# Patient Record
Sex: FEMALE | URBAN - METROPOLITAN AREA
[De-identification: names, ages, dates, MRNs, and addresses within clinical notes are randomized per-mention and may not be internally consistent; named-entity substitution may affect disease eponyms.]

---

## 2019-04-11 ENCOUNTER — NEW PATIENT (OUTPATIENT)
Dept: URBAN - METROPOLITAN AREA CLINIC 60 | Facility: CLINIC | Age: 54
End: 2019-04-11
Payer: COMMERCIAL

## 2019-04-11 DIAGNOSIS — H52.4 PRESBYOPIA: ICD-10-CM

## 2019-04-11 DIAGNOSIS — M06.9 RHEUMATOID ARTHRITIS, UNSPECIFIED: Primary | ICD-10-CM

## 2019-04-11 DIAGNOSIS — Z79.899 OTHER LONG TERM (CURRENT) DRUG THERAPY: ICD-10-CM

## 2019-04-11 DIAGNOSIS — H25.813 COMBINED FORMS OF AGE-RELATED CATARACT, BILATERAL: ICD-10-CM

## 2019-04-11 PROCEDURE — 92004 COMPRE OPH EXAM NEW PT 1/>: CPT | Performed by: OPTOMETRIST

## 2019-04-11 PROCEDURE — 92134 CPTRZ OPH DX IMG PST SGM RTA: CPT | Performed by: OPTOMETRIST

## 2019-04-11 ASSESSMENT — INTRAOCULAR PRESSURE
OD: 17
OS: 17

## 2019-05-01 ENCOUNTER — Encounter (OUTPATIENT)
Dept: URBAN - METROPOLITAN AREA CLINIC 60 | Facility: CLINIC | Age: 54
End: 2019-05-01
Payer: COMMERCIAL

## 2019-05-01 PROCEDURE — 92081 LIMITED VISUAL FIELD XM: CPT | Performed by: OPTOMETRIST

## 2020-10-19 ENCOUNTER — FOLLOW UP ESTABLISHED (OUTPATIENT)
Dept: URBAN - METROPOLITAN AREA CLINIC 60 | Facility: CLINIC | Age: 55
End: 2020-10-19
Payer: COMMERCIAL

## 2020-10-19 DIAGNOSIS — H16.223 KERATOCONJUNCTIVITIS SICCA, BILATERAL: ICD-10-CM

## 2020-10-19 PROCEDURE — 92083 EXTENDED VISUAL FIELD XM: CPT | Performed by: OPTOMETRIST

## 2020-10-19 PROCEDURE — 92134 CPTRZ OPH DX IMG PST SGM RTA: CPT | Performed by: OPTOMETRIST

## 2020-10-19 PROCEDURE — 92014 COMPRE OPH EXAM EST PT 1/>: CPT | Performed by: OPTOMETRIST

## 2020-10-19 ASSESSMENT — VISUAL ACUITY
OD: 20/25
OS: 20/20

## 2020-10-19 ASSESSMENT — INTRAOCULAR PRESSURE
OD: 16
OS: 16

## 2021-09-03 ENCOUNTER — OFFICE VISIT (OUTPATIENT)
Dept: URBAN - METROPOLITAN AREA CLINIC 60 | Facility: CLINIC | Age: 56
End: 2021-09-03
Payer: COMMERCIAL

## 2021-09-03 DIAGNOSIS — H25.13 AGE-RELATED NUCLEAR CATARACT, BILATERAL: ICD-10-CM

## 2021-09-03 PROCEDURE — 92014 COMPRE OPH EXAM EST PT 1/>: CPT | Performed by: OPTOMETRIST

## 2021-09-03 PROCEDURE — 92134 CPTRZ OPH DX IMG PST SGM RTA: CPT | Performed by: OPTOMETRIST

## 2021-09-03 ASSESSMENT — VISUAL ACUITY
OS: 20/20
OD: 20/25

## 2021-09-03 ASSESSMENT — INTRAOCULAR PRESSURE
OD: 16
OS: 18

## 2021-09-03 NOTE — IMPRESSION/PLAN
Impression: Other long term (current) drug therapy: Z79.899. Plan: No evidence of Plaquenil toxicity. Reviewed today's OCT(MAC) results with patient. Results of OCT normal OU. Patient was educated on today's findings. Recommend patient return yearly for a complete eye exam and OCT(MAC).

## 2023-08-24 ENCOUNTER — HOSPITAL ENCOUNTER (EMERGENCY)
Facility: HOSPITAL | Age: 58
Discharge: HOME | End: 2023-08-24
Attending: EMERGENCY MEDICINE
Payer: MEDICAID

## 2023-08-24 VITALS
SYSTOLIC BLOOD PRESSURE: 133 MMHG | BODY MASS INDEX: 37.56 KG/M2 | HEART RATE: 94 BPM | HEIGHT: 64 IN | TEMPERATURE: 98 F | RESPIRATION RATE: 16 BRPM | WEIGHT: 220 LBS | DIASTOLIC BLOOD PRESSURE: 85 MMHG | OXYGEN SATURATION: 96 %

## 2023-08-24 DIAGNOSIS — Z76.0 ENCOUNTER FOR MEDICATION REFILL: Primary | ICD-10-CM

## 2023-08-24 PROCEDURE — 99283 EMERGENCY DEPT VISIT LOW MDM: CPT

## 2023-08-24 RX ORDER — BENZTROPINE MESYLATE 1 MG/1
1 TABLET ORAL 2 TIMES DAILY
COMMUNITY

## 2023-08-24 RX ORDER — DULOXETIN HYDROCHLORIDE 30 MG/1
CAPSULE, DELAYED RELEASE ORAL DAILY
COMMUNITY

## 2023-08-24 RX ORDER — AMITRIPTYLINE HYDROCHLORIDE 10 MG/1
10 TABLET, FILM COATED ORAL NIGHTLY
COMMUNITY
End: 2023-09-08

## 2023-08-24 RX ORDER — DULOXETIN HYDROCHLORIDE 30 MG/1
30 CAPSULE, DELAYED RELEASE ORAL DAILY
Qty: 30 CAPSULE | Refills: 0 | Status: SHIPPED | OUTPATIENT
Start: 2023-08-24 | End: 2023-09-05

## 2023-08-24 RX ORDER — CETIRIZINE HYDROCHLORIDE 5 MG/1
5 TABLET ORAL DAILY
COMMUNITY

## 2023-08-24 RX ORDER — LEVOTHYROXINE SODIUM 0.03 MG/1
50 TABLET ORAL
COMMUNITY
End: 2023-09-05 | Stop reason: DRUGHIGH

## 2023-08-24 RX ORDER — HALOPERIDOL 10 MG/1
10 TABLET ORAL NIGHTLY
COMMUNITY

## 2023-08-24 RX ORDER — LITHIUM CARBONATE 300 MG/1
300 CAPSULE ORAL 2 TIMES DAILY WITH MEALS
COMMUNITY
End: 2023-09-10

## 2023-08-24 RX ORDER — TRIAMCINOLONE ACETONIDE 1 MG/G
1 CREAM TOPICAL 2 TIMES DAILY
Qty: 1 EACH | Refills: 0 | Status: SHIPPED | OUTPATIENT
Start: 2023-08-24 | End: 2023-08-24

## 2023-08-24 RX ORDER — CHOLECALCIFEROL (VITAMIN D3) 125 MCG
2000 CAPSULE ORAL DAILY
COMMUNITY

## 2023-08-24 RX ORDER — OMEPRAZOLE 20 MG/1
20 CAPSULE, DELAYED RELEASE ORAL
COMMUNITY
End: 2023-09-18

## 2023-08-24 RX ORDER — ARIPIPRAZOLE 20 MG/1
20 TABLET ORAL DAILY
COMMUNITY
End: 2023-09-10

## 2023-08-24 RX ORDER — DULOXETIN HYDROCHLORIDE 30 MG/1
30 CAPSULE, DELAYED RELEASE ORAL DAILY
Qty: 30 CAPSULE | Refills: 0 | Status: SHIPPED | OUTPATIENT
Start: 2023-08-24 | End: 2023-08-24

## 2023-08-24 RX ORDER — LEVOTHYROXINE SODIUM 0.05 MG/1
50 TABLET ORAL
Qty: 30 TABLET | Refills: 0 | Status: SHIPPED | OUTPATIENT
Start: 2023-08-24 | End: 2023-08-24

## 2023-08-24 RX ORDER — DOCUSATE SODIUM 250 MG
250 CAPSULE ORAL DAILY
COMMUNITY

## 2023-08-24 RX ORDER — LEVOTHYROXINE SODIUM 0.05 MG/1
50 TABLET ORAL
Qty: 30 TABLET | Refills: 0 | Status: SHIPPED | OUTPATIENT
Start: 2023-08-24 | End: 2023-09-07

## 2023-08-24 RX ORDER — HALOPERIDOL 10 MG/1
10 TABLET ORAL EVERY EVENING
Qty: 30 TABLET | Refills: 0 | Status: SHIPPED | OUTPATIENT
Start: 2023-08-24 | End: 2023-08-24

## 2023-08-24 RX ORDER — TRIAMCINOLONE ACETONIDE 1 MG/G
1 CREAM TOPICAL 2 TIMES DAILY
Qty: 1 EACH | Refills: 0 | Status: SHIPPED | OUTPATIENT
Start: 2023-08-24 | End: 2023-09-08

## 2023-08-24 RX ORDER — HYDROXYZINE 50 MG/1
50 TABLET, FILM COATED ORAL 3 TIMES DAILY PRN
COMMUNITY

## 2023-08-24 RX ORDER — METHOCARBAMOL 500 MG/1
500 TABLET, FILM COATED ORAL 3 TIMES DAILY
COMMUNITY

## 2023-08-24 RX ORDER — HALOPERIDOL 10 MG/1
10 TABLET ORAL EVERY EVENING
Qty: 30 TABLET | Refills: 0 | Status: SHIPPED | OUTPATIENT
Start: 2023-08-24 | End: 2023-09-05

## 2023-08-24 NOTE — ED INITIAL ASSESSMENT (HPI)
Pt needs her home meds refilled. She just moved to [place] from [facility] and does not yet have a physician.

## 2023-08-25 NOTE — DISCHARGE INSTRUCTIONS
Follow-up with Aye Ports return if feeling suicidal, homicidal.  Return if any other complaints. Follow-up with your primary care physician and Aye Ports for further evaluation.

## 2023-09-05 ENCOUNTER — OFFICE VISIT (OUTPATIENT)
Dept: FAMILY MEDICINE CLINIC | Facility: CLINIC | Age: 58
End: 2023-09-05
Payer: MEDICAID

## 2023-09-05 VITALS
SYSTOLIC BLOOD PRESSURE: 118 MMHG | BODY MASS INDEX: 38.07 KG/M2 | RESPIRATION RATE: 18 BRPM | DIASTOLIC BLOOD PRESSURE: 68 MMHG | TEMPERATURE: 98 F | WEIGHT: 223 LBS | OXYGEN SATURATION: 99 % | HEIGHT: 64 IN | HEART RATE: 81 BPM

## 2023-09-05 DIAGNOSIS — R31.29 MICROSCOPIC HEMATURIA: ICD-10-CM

## 2023-09-05 DIAGNOSIS — Z13.1 SCREENING FOR DIABETES MELLITUS: ICD-10-CM

## 2023-09-05 DIAGNOSIS — Z12.11 SCREENING FOR COLON CANCER: ICD-10-CM

## 2023-09-05 DIAGNOSIS — J30.9 ALLERGIC RHINITIS, UNSPECIFIED SEASONALITY, UNSPECIFIED TRIGGER: ICD-10-CM

## 2023-09-05 DIAGNOSIS — F43.10 PTSD (POST-TRAUMATIC STRESS DISORDER): ICD-10-CM

## 2023-09-05 DIAGNOSIS — M54.50 CHRONIC LOW BACK PAIN WITHOUT SCIATICA, UNSPECIFIED BACK PAIN LATERALITY: ICD-10-CM

## 2023-09-05 DIAGNOSIS — Z12.4 SCREENING FOR CERVICAL CANCER: ICD-10-CM

## 2023-09-05 DIAGNOSIS — F31.9 BIPOLAR 1 DISORDER (HCC): ICD-10-CM

## 2023-09-05 DIAGNOSIS — R25.2 LEG CRAMPS: ICD-10-CM

## 2023-09-05 DIAGNOSIS — Z12.31 ENCOUNTER FOR SCREENING MAMMOGRAM FOR MALIGNANT NEOPLASM OF BREAST: ICD-10-CM

## 2023-09-05 DIAGNOSIS — B37.2 YEAST DERMATITIS: ICD-10-CM

## 2023-09-05 DIAGNOSIS — M06.9 RHEUMATOID ARTHRITIS, INVOLVING UNSPECIFIED SITE, UNSPECIFIED WHETHER RHEUMATOID FACTOR PRESENT (HCC): ICD-10-CM

## 2023-09-05 DIAGNOSIS — Z00.00 LABORATORY EXAM ORDERED AS PART OF ROUTINE GENERAL MEDICAL EXAMINATION: ICD-10-CM

## 2023-09-05 DIAGNOSIS — K21.9 GASTROESOPHAGEAL REFLUX DISEASE, UNSPECIFIED WHETHER ESOPHAGITIS PRESENT: ICD-10-CM

## 2023-09-05 DIAGNOSIS — J45.20 MILD INTERMITTENT ASTHMA WITHOUT COMPLICATION: ICD-10-CM

## 2023-09-05 DIAGNOSIS — E03.9 ACQUIRED HYPOTHYROIDISM: ICD-10-CM

## 2023-09-05 DIAGNOSIS — R13.10 DYSPHAGIA, UNSPECIFIED TYPE: ICD-10-CM

## 2023-09-05 DIAGNOSIS — Z00.00 WELLNESS EXAMINATION: Primary | ICD-10-CM

## 2023-09-05 DIAGNOSIS — G89.29 CHRONIC LOW BACK PAIN WITHOUT SCIATICA, UNSPECIFIED BACK PAIN LATERALITY: ICD-10-CM

## 2023-09-05 DIAGNOSIS — Z87.440 HISTORY OF UTI: ICD-10-CM

## 2023-09-05 LAB
APPEARANCE: CLEAR
BILIRUBIN: NEGATIVE
GLUCOSE (URINE DIPSTICK): NEGATIVE MG/DL
KETONES (URINE DIPSTICK): NEGATIVE MG/DL
LEUKOCYTES: NEGATIVE
MULTISTIX LOT#: ABNORMAL NUMERIC
NITRITE, URINE: NEGATIVE
PH, URINE: 7 (ref 4.5–8)
PROTEIN (URINE DIPSTICK): NEGATIVE MG/DL
SPECIFIC GRAVITY: 1.01 (ref 1–1.03)
URINE-COLOR: YELLOW
UROBILINOGEN,SEMI-QN: 0.2 MG/DL (ref 0–1.9)

## 2023-09-05 PROCEDURE — 87624 HPV HI-RISK TYP POOLED RSLT: CPT | Performed by: FAMILY MEDICINE

## 2023-09-05 PROCEDURE — 87086 URINE CULTURE/COLONY COUNT: CPT | Performed by: FAMILY MEDICINE

## 2023-09-05 PROCEDURE — 3008F BODY MASS INDEX DOCD: CPT | Performed by: FAMILY MEDICINE

## 2023-09-05 PROCEDURE — 99386 PREV VISIT NEW AGE 40-64: CPT | Performed by: FAMILY MEDICINE

## 2023-09-05 PROCEDURE — 88175 CYTOPATH C/V AUTO FLUID REDO: CPT | Performed by: FAMILY MEDICINE

## 2023-09-05 PROCEDURE — 81003 URINALYSIS AUTO W/O SCOPE: CPT | Performed by: FAMILY MEDICINE

## 2023-09-05 PROCEDURE — 3078F DIAST BP <80 MM HG: CPT | Performed by: FAMILY MEDICINE

## 2023-09-05 PROCEDURE — 3074F SYST BP LT 130 MM HG: CPT | Performed by: FAMILY MEDICINE

## 2023-09-05 RX ORDER — HALOPERIDOL 5 MG/1
5 TABLET ORAL EVERY MORNING
COMMUNITY
End: 2023-09-08

## 2023-09-05 RX ORDER — KETOCONAZOLE 20 MG/G
CREAM TOPICAL
Qty: 30 G | Refills: 1 | Status: SHIPPED | OUTPATIENT
Start: 2023-09-05

## 2023-09-05 RX ORDER — ALBUTEROL SULFATE 90 UG/1
2 AEROSOL, METERED RESPIRATORY (INHALATION) EVERY 4 HOURS PRN
Qty: 1 EACH | Refills: 0 | Status: SHIPPED | OUTPATIENT
Start: 2023-09-05

## 2023-09-05 NOTE — PATIENT INSTRUCTIONS
Go [facility] for your fasting blood tests. Do not eat or drink except for water for at least 8 hours prior to the blood tests. Do not take any vitamins or Biotin before your blood tests. Schedule your mammogram as ordered. Schedule your colonoscopy as ordered. Make an appointment with the gastroenterologist, Dr. Mook Kovacs or one of his partners, for further evaluation of your heartburn and difficulty swallowing. Make an appointment with the rheumatologist, Dr. Kenny Fernandez or one of her partners, for further evaluation of your rheumatoid arthritis. Keep your appointment with the behavorial health provider as scheduled on 9/8/2023. Continue your medications as prescribed. Make an appointment with the [facility] department vaccine clinic. Recommendations for exercise are 3-5 times weekly for 30-60 minutes for a minimum of 150-300 minutes. For premenopausal women and men, 1000 mg of calcium daily is recommended. For postmenopausal women, 1200 mg of calcium daily is recommended. To help the body absorb and use calcium, vitamin D 2000 international units daily is recommended. I will contact you with your test results once available.

## 2023-09-06 LAB — HPV I/H RISK 1 DNA SPEC QL NAA+PROBE: NEGATIVE

## 2023-09-07 ENCOUNTER — LAB ENCOUNTER (OUTPATIENT)
Dept: LAB | Age: 58
End: 2023-09-07
Attending: FAMILY MEDICINE
Payer: MEDICAID

## 2023-09-07 DIAGNOSIS — Z00.00 LABORATORY EXAM ORDERED AS PART OF ROUTINE GENERAL MEDICAL EXAMINATION: ICD-10-CM

## 2023-09-07 DIAGNOSIS — M06.9: Primary | ICD-10-CM

## 2023-09-07 DIAGNOSIS — R25.2 LEG CRAMPS: ICD-10-CM

## 2023-09-07 DIAGNOSIS — E03.9 ACQUIRED HYPOTHYROIDISM: Primary | ICD-10-CM

## 2023-09-07 DIAGNOSIS — F31.9 BIPOLAR 1 DISORDER (HCC): ICD-10-CM

## 2023-09-07 DIAGNOSIS — Z13.1 SCREENING FOR DIABETES MELLITUS: ICD-10-CM

## 2023-09-07 DIAGNOSIS — M06.9 RHEUMATOID ARTHRITIS, INVOLVING UNSPECIFIED SITE, UNSPECIFIED WHETHER RHEUMATOID FACTOR PRESENT (HCC): ICD-10-CM

## 2023-09-07 LAB
ALBUMIN SERPL-MCNC: 3.6 G/DL (ref 3.4–5)
ALBUMIN/GLOB SERPL: 0.8 {RATIO} (ref 1–2)
ALP LIVER SERPL-CCNC: 78 U/L
ALT SERPL-CCNC: 31 U/L
ANION GAP SERPL CALC-SCNC: 5 MMOL/L (ref 0–18)
AST SERPL-CCNC: 18 U/L (ref 15–37)
BASOPHILS # BLD AUTO: 0.06 X10(3) UL (ref 0–0.2)
BASOPHILS NFR BLD AUTO: 0.5 %
BILIRUB SERPL-MCNC: 0.4 MG/DL (ref 0.1–2)
BUN BLD-MCNC: 11 MG/DL (ref 7–18)
CALCIUM BLD-MCNC: 9.4 MG/DL (ref 8.5–10.1)
CHLORIDE SERPL-SCNC: 105 MMOL/L (ref 98–112)
CHOLEST SERPL-MCNC: 278 MG/DL (ref ?–200)
CO2 SERPL-SCNC: 29 MMOL/L (ref 21–32)
CREAT BLD-MCNC: 1.06 MG/DL
CRP SERPL-MCNC: 0.88 MG/DL (ref ?–0.3)
EGFRCR SERPLBLD CKD-EPI 2021: 62 ML/MIN/1.73M2 (ref 60–?)
EOSINOPHIL # BLD AUTO: 0.27 X10(3) UL (ref 0–0.7)
EOSINOPHIL NFR BLD AUTO: 2.3 %
ERYTHROCYTE [DISTWIDTH] IN BLOOD BY AUTOMATED COUNT: 12.5 %
ERYTHROCYTE [SEDIMENTATION RATE] IN BLOOD: 67 MM/HR
EST. AVERAGE GLUCOSE BLD GHB EST-MCNC: 131 MG/DL (ref 68–126)
FASTING PATIENT LIPID ANSWER: YES
FASTING STATUS PATIENT QL REPORTED: YES
GLOBULIN PLAS-MCNC: 4.5 G/DL (ref 2.8–4.4)
GLUCOSE BLD-MCNC: 128 MG/DL (ref 70–99)
HBA1C MFR BLD: 6.2 % (ref ?–5.7)
HCT VFR BLD AUTO: 38.6 %
HCV AB SERPL QL IA: NONREACTIVE
HDLC SERPL-MCNC: 49 MG/DL (ref 40–59)
HGB BLD-MCNC: 12.1 G/DL
IMM GRANULOCYTES # BLD AUTO: 0.06 X10(3) UL (ref 0–1)
IMM GRANULOCYTES NFR BLD: 0.5 %
LDLC SERPL CALC-MCNC: 180 MG/DL (ref ?–100)
LITHIUM SERPL-SCNC: 0.7 MMOL/L (ref 0.6–1.2)
LYMPHOCYTES # BLD AUTO: 2.95 X10(3) UL (ref 1–4)
LYMPHOCYTES NFR BLD AUTO: 25.4 %
MAGNESIUM SERPL-MCNC: 1.9 MG/DL (ref 1.6–2.6)
MCH RBC QN AUTO: 29.3 PG (ref 26–34)
MCHC RBC AUTO-ENTMCNC: 31.3 G/DL (ref 31–37)
MCV RBC AUTO: 93.5 FL
MONOCYTES # BLD AUTO: 0.8 X10(3) UL (ref 0.1–1)
MONOCYTES NFR BLD AUTO: 6.9 %
NEUTROPHILS # BLD AUTO: 7.46 X10 (3) UL (ref 1.5–7.7)
NEUTROPHILS # BLD AUTO: 7.46 X10(3) UL (ref 1.5–7.7)
NEUTROPHILS NFR BLD AUTO: 64.4 %
NONHDLC SERPL-MCNC: 229 MG/DL (ref ?–130)
OSMOLALITY SERPL CALC.SUM OF ELEC: 289 MOSM/KG (ref 275–295)
PLATELET # BLD AUTO: 332 10(3)UL (ref 150–450)
POTASSIUM SERPL-SCNC: 3.9 MMOL/L (ref 3.5–5.1)
PROT SERPL-MCNC: 8.1 G/DL (ref 6.4–8.2)
RBC # BLD AUTO: 4.13 X10(6)UL
RHEUMATOID FACT SERPL-ACNC: <10 IU/ML (ref ?–15)
SODIUM SERPL-SCNC: 139 MMOL/L (ref 136–145)
T4 FREE SERPL-MCNC: 0.9 NG/DL (ref 0.8–1.7)
TRIGL SERPL-MCNC: 256 MG/DL (ref 30–149)
TSI SER-ACNC: 9.72 MIU/ML (ref 0.36–3.74)
URATE SERPL-MCNC: 6.3 MG/DL
VLDLC SERPL CALC-MCNC: 53 MG/DL (ref 0–30)
WBC # BLD AUTO: 11.6 X10(3) UL (ref 4–11)

## 2023-09-07 PROCEDURE — 86200 CCP ANTIBODY: CPT

## 2023-09-07 PROCEDURE — 86225 DNA ANTIBODY NATIVE: CPT

## 2023-09-07 PROCEDURE — 80178 ASSAY OF LITHIUM: CPT

## 2023-09-07 PROCEDURE — 84550 ASSAY OF BLOOD/URIC ACID: CPT

## 2023-09-07 PROCEDURE — 83735 ASSAY OF MAGNESIUM: CPT

## 2023-09-07 PROCEDURE — 86038 ANTINUCLEAR ANTIBODIES: CPT

## 2023-09-07 PROCEDURE — 36415 COLL VENOUS BLD VENIPUNCTURE: CPT

## 2023-09-07 PROCEDURE — 86140 C-REACTIVE PROTEIN: CPT

## 2023-09-07 PROCEDURE — 86803 HEPATITIS C AB TEST: CPT

## 2023-09-07 PROCEDURE — 80053 COMPREHEN METABOLIC PANEL: CPT

## 2023-09-07 PROCEDURE — 84443 ASSAY THYROID STIM HORMONE: CPT

## 2023-09-07 PROCEDURE — 85025 COMPLETE CBC W/AUTO DIFF WBC: CPT

## 2023-09-07 PROCEDURE — 84439 ASSAY OF FREE THYROXINE: CPT

## 2023-09-07 PROCEDURE — 83036 HEMOGLOBIN GLYCOSYLATED A1C: CPT

## 2023-09-07 PROCEDURE — 80061 LIPID PANEL: CPT

## 2023-09-07 PROCEDURE — 86431 RHEUMATOID FACTOR QUANT: CPT

## 2023-09-07 PROCEDURE — 85652 RBC SED RATE AUTOMATED: CPT

## 2023-09-07 RX ORDER — LEVOTHYROXINE SODIUM 0.07 MG/1
75 TABLET ORAL
Qty: 90 TABLET | Refills: 0 | Status: SHIPPED | OUTPATIENT
Start: 2023-09-07 | End: 2023-12-06

## 2023-09-08 ENCOUNTER — HOSPITAL ENCOUNTER (OUTPATIENT)
Age: 58
Discharge: HOME | End: 2023-09-08
Payer: MEDICAID

## 2023-09-08 VITALS
SYSTOLIC BLOOD PRESSURE: 109 MMHG | HEIGHT: 64 IN | BODY MASS INDEX: 38.07 KG/M2 | TEMPERATURE: 98 F | WEIGHT: 223 LBS | DIASTOLIC BLOOD PRESSURE: 73 MMHG | OXYGEN SATURATION: 97 % | HEART RATE: 81 BPM | RESPIRATION RATE: 18 BRPM

## 2023-09-08 DIAGNOSIS — Z20.822 ENCOUNTER FOR LABORATORY TESTING FOR COVID-19 VIRUS: ICD-10-CM

## 2023-09-08 DIAGNOSIS — Z20.822 CLOSE EXPOSURE TO COVID-19 VIRUS: Primary | ICD-10-CM

## 2023-09-08 DIAGNOSIS — Z76.0 ENCOUNTER FOR MEDICATION REFILL: ICD-10-CM

## 2023-09-08 LAB
CCP IGG SERPL-ACNC: <0.4 U/ML (ref 0–6.9)
DSDNA IGG SERPL IA-ACNC: <0.6 IU/ML
ENA AB SER QL IA: 0.1 UG/L
ENA AB SER QL IA: NEGATIVE

## 2023-09-08 PROCEDURE — 99213 OFFICE O/P EST LOW 20 MIN: CPT | Performed by: NURSE PRACTITIONER

## 2023-09-08 RX ORDER — AMITRIPTYLINE HYDROCHLORIDE 10 MG/1
10 TABLET, FILM COATED ORAL NIGHTLY
Qty: 14 TABLET | Refills: 0 | Status: SHIPPED | OUTPATIENT
Start: 2023-09-08 | End: 2023-09-10

## 2023-09-08 RX ORDER — HALOPERIDOL 5 MG/1
5 TABLET ORAL EVERY MORNING
Qty: 14 TABLET | Refills: 0 | Status: SHIPPED | OUTPATIENT
Start: 2023-09-08

## 2023-09-08 RX ORDER — CLONAZEPAM 1 MG/1
1 TABLET ORAL 2 TIMES DAILY
Qty: 28 TABLET | Refills: 0 | Status: SHIPPED | OUTPATIENT
Start: 2023-09-08

## 2023-09-08 RX ORDER — DEXTROAMPHETAMINE SACCHARATE, AMPHETAMINE ASPARTATE, DEXTROAMPHETAMINE SULFATE AND AMPHETAMINE SULFATE 7.5; 7.5; 7.5; 7.5 MG/1; MG/1; MG/1; MG/1
30 TABLET ORAL 2 TIMES DAILY
Qty: 28 TABLET | Refills: 0 | Status: SHIPPED | OUTPATIENT
Start: 2023-09-08 | End: 2023-09-22

## 2023-09-10 ENCOUNTER — TELEPHONE (OUTPATIENT)
Dept: FAMILY MEDICINE CLINIC | Facility: CLINIC | Age: 58
End: 2023-09-10

## 2023-09-10 LAB — SARS-COV-2 RNA RESP QL NAA+PROBE: NOT DETECTED

## 2023-09-10 RX ORDER — ARIPIPRAZOLE 20 MG/1
20 TABLET ORAL DAILY
Qty: 90 TABLET | Refills: 0 | Status: SHIPPED | OUTPATIENT
Start: 2023-09-10 | End: 2023-12-09

## 2023-09-10 RX ORDER — AMITRIPTYLINE HYDROCHLORIDE 150 MG/1
150 TABLET ORAL NIGHTLY
Qty: 90 TABLET | Refills: 0 | Status: SHIPPED | OUTPATIENT
Start: 2023-09-10 | End: 2023-12-09

## 2023-09-10 RX ORDER — LITHIUM CARBONATE 300 MG/1
300 CAPSULE ORAL 2 TIMES DAILY WITH MEALS
Qty: 180 CAPSULE | Refills: 0 | Status: SHIPPED | OUTPATIENT
Start: 2023-09-10 | End: 2023-12-09

## 2023-09-10 NOTE — TELEPHONE ENCOUNTER
Patient's sister and guardian Estela Pitt called and patient is present to discuss medication refills. Estela Pitt stated that she is getting her sisters medication together and noticed that she did not have enough medications starting tomorrow. Patient needed refills on the amitriptyline  HCL 150mg nightly , lithium,300mg bid and aripiprazole 20mg daily. Patients sister had read the previous bottles of medication to reconcile the medication to confirm dosages. Discussed that patient will need to schedule follow up with Dr. Bobbi Pham office. Patient and sister are agreeable to this plan of care. Refills have been sent to preferred pharmacy.

## 2023-09-15 ENCOUNTER — TELEPHONE (OUTPATIENT)
Dept: FAMILY MEDICINE CLINIC | Facility: CLINIC | Age: 58
End: 2023-09-15

## 2023-09-18 ENCOUNTER — TELEMEDICINE (OUTPATIENT)
Dept: FAMILY MEDICINE CLINIC | Facility: CLINIC | Age: 58
End: 2023-09-18
Payer: MEDICAID

## 2023-09-18 VITALS — BODY MASS INDEX: 39 KG/M2 | WEIGHT: 230 LBS

## 2023-09-18 DIAGNOSIS — F31.9 BIPOLAR 1 DISORDER (HCC): ICD-10-CM

## 2023-09-18 DIAGNOSIS — K21.9 GASTROESOPHAGEAL REFLUX DISEASE, UNSPECIFIED WHETHER ESOPHAGITIS PRESENT: ICD-10-CM

## 2023-09-18 DIAGNOSIS — J45.20 MILD INTERMITTENT ASTHMA WITHOUT COMPLICATION: ICD-10-CM

## 2023-09-18 DIAGNOSIS — E78.2 MIXED HYPERLIPIDEMIA: Primary | ICD-10-CM

## 2023-09-18 DIAGNOSIS — M06.00 RHEUMATOID ARTHRITIS WITH NEGATIVE RHEUMATOID FACTOR, INVOLVING UNSPECIFIED SITE (HCC): ICD-10-CM

## 2023-09-18 DIAGNOSIS — R73.03 PREDIABETES: ICD-10-CM

## 2023-09-18 DIAGNOSIS — E03.9 ACQUIRED HYPOTHYROIDISM: ICD-10-CM

## 2023-09-18 RX ORDER — ARIPIPRAZOLE 20 MG/1
20 TABLET ORAL DAILY
Qty: 90 TABLET | Refills: 0 | OUTPATIENT
Start: 2023-09-18

## 2023-09-18 RX ORDER — OMEPRAZOLE 20 MG/1
20 CAPSULE, DELAYED RELEASE ORAL
Qty: 90 CAPSULE | Refills: 1 | Status: SHIPPED | OUTPATIENT
Start: 2023-09-18

## 2023-09-18 RX ORDER — ATORVASTATIN CALCIUM 20 MG/1
20 TABLET, FILM COATED ORAL NIGHTLY
Qty: 90 TABLET | Refills: 1 | Status: SHIPPED | OUTPATIENT
Start: 2023-09-18

## 2023-09-18 NOTE — TELEPHONE ENCOUNTER
Requested Renewals     Name from pharmacy: ARIPIPRAZOLE 20MG (TWENTY MG) TABS         Will file in chart as: ARIPIPRAZOLE 20 MG Oral Tab     Possible duplicate: Hover to review recent actions on this medication    Sig: TAKE 1 TABLET(20 MG) BY MOUTH DAILY    Disp: 90 tablet    Refills: 0 (Pharmacy requested: Not specified)    Start: 9/15/2023    Class: Normal    Non-formulary    To pharmacy: ZERO refills remain on this prescription.  Your patient is requesting advance approval of refills for this medication to [address] [facility]          Future Appointments   Date Time Provider Roxana Malave   9/18/2023  2:00 PM Ladi Vargas DO [facility] [place]

## 2023-09-20 ENCOUNTER — MED REC SCAN ONLY (OUTPATIENT)
Dept: FAMILY MEDICINE CLINIC | Facility: CLINIC | Age: 58
End: 2023-09-20

## 2023-09-25 ENCOUNTER — TELEPHONE (OUTPATIENT)
Dept: FAMILY MEDICINE CLINIC | Facility: CLINIC | Age: 58
End: 2023-09-25

## 2023-09-25 RX ORDER — DULOXETIN HYDROCHLORIDE 30 MG/1
30 CAPSULE, DELAYED RELEASE ORAL DAILY
Qty: 90 CAPSULE | Refills: 0 | Status: SHIPPED | OUTPATIENT
Start: 2023-09-25

## 2023-09-25 RX ORDER — CLONAZEPAM 1 MG/1
1 TABLET ORAL 2 TIMES DAILY
Qty: 28 TABLET | Refills: 0 | Status: SHIPPED | OUTPATIENT
Start: 2023-09-25

## 2023-09-25 RX ORDER — HALOPERIDOL 10 MG/1
10 TABLET ORAL NIGHTLY
Qty: 14 TABLET | Refills: 0 | Status: SHIPPED | OUTPATIENT
Start: 2023-09-25

## 2023-09-25 RX ORDER — HYDROXYZINE 50 MG/1
50 TABLET, FILM COATED ORAL 3 TIMES DAILY PRN
Qty: 90 TABLET | Refills: 1 | Status: SHIPPED | OUTPATIENT
Start: 2023-09-25

## 2023-09-25 RX ORDER — METHOCARBAMOL 500 MG/1
500 TABLET, FILM COATED ORAL 3 TIMES DAILY PRN
Qty: 90 TABLET | Refills: 0 | Status: SHIPPED | OUTPATIENT
Start: 2023-09-25

## 2023-09-25 RX ORDER — HALOPERIDOL 10 MG/1
10 TABLET ORAL NIGHTLY
Qty: 30 TABLET | Refills: 0 | Status: CANCELLED | OUTPATIENT
Start: 2023-09-25

## 2023-09-25 NOTE — TELEPHONE ENCOUNTER
Patients sister calling for refills for [place]:    methocarbamol 500 MG Oral Tab  - Only has one pill left   hydrOXYzine 50 MG Oral Tab   clonazePAM 1 MG Oral Tab   haloperidol 10 MG Oral Tab     Would like them sent to [facility] on file. Please advise.

## 2023-09-25 NOTE — TELEPHONE ENCOUNTER
Doc,   You have not prescribed these meds for pt before and       Haloperidol 5 mg filled on 9/21/23  Clonazepam 1mg filled on 9/8 by another provider

## 2023-09-25 NOTE — TELEPHONE ENCOUNTER
Patient sister would like medication refill on duloxetine 30 MG. Dr. Haider Ram has not prescribed medication before. States patient taking it for pain.

## 2023-09-26 ENCOUNTER — TELEPHONE (OUTPATIENT)
Dept: FAMILY MEDICINE CLINIC | Facility: CLINIC | Age: 58
End: 2023-09-26

## 2023-09-28 ENCOUNTER — TELEPHONE (OUTPATIENT)
Dept: FAMILY MEDICINE CLINIC | Facility: CLINIC | Age: 58
End: 2023-09-28

## 2023-09-29 ENCOUNTER — TELEPHONE (OUTPATIENT)
Dept: FAMILY MEDICINE CLINIC | Facility: CLINIC | Age: 58
End: 2023-09-29

## 2023-09-29 NOTE — TELEPHONE ENCOUNTER
This is too soon for refill. 28 tabs dispensed (14-day supply) on 9/18/23, ordered by Mabel Marin. CLONAZEPAM  1MG TABLETS Dispensed Written Quantity Days Supply Provider Pharmacy    09/18/2023 09/08/2023 28 each [address] ..

## 2023-09-29 NOTE — TELEPHONE ENCOUNTER
Maninder Rendon requesting medication refill for clonazePAM 1 MG Oral Tab . Patient [address] (Yes/No): Yes  LOV: 9/5/2023   Last Refill Date:   27 or 80 Day Supply:  Pharmacy Location: [facility] on file  Prescribed By: Dr. lLuvia Feliciano  Next Scheduled Appointment:       Informed patient to allow up to 24 to 48 Business hours for a call back from a nurse.

## 2023-09-29 NOTE — TELEPHONE ENCOUNTER
Spoke to pharmacist, Sirena Garcia. Dr Malena Jesus sent clonazepam 1mg script on 9/25/23. Prescription rejected at pharmacy due to [facility] dispensing guidelines.  Pharmacist provided Medicaid #[id number]

## 2023-10-01 ENCOUNTER — HOSPITAL ENCOUNTER (EMERGENCY)
Facility: HOSPITAL | Age: 58
Discharge: HOME | End: 2023-10-01
Attending: EMERGENCY MEDICINE

## 2023-10-01 VITALS
SYSTOLIC BLOOD PRESSURE: 120 MMHG | WEIGHT: 225 LBS | OXYGEN SATURATION: 95 % | HEIGHT: 64 IN | HEART RATE: 81 BPM | DIASTOLIC BLOOD PRESSURE: 75 MMHG | RESPIRATION RATE: 16 BRPM | BODY MASS INDEX: 38.41 KG/M2 | TEMPERATURE: 98 F

## 2023-10-01 DIAGNOSIS — K04.7 DENTAL INFECTION: Primary | ICD-10-CM

## 2023-10-01 PROCEDURE — 96372 THER/PROPH/DIAG INJ SC/IM: CPT

## 2023-10-01 PROCEDURE — 99283 EMERGENCY DEPT VISIT LOW MDM: CPT

## 2023-10-01 PROCEDURE — 99284 EMERGENCY DEPT VISIT MOD MDM: CPT

## 2023-10-01 RX ORDER — CLINDAMYCIN HYDROCHLORIDE 300 MG/1
300 CAPSULE ORAL 3 TIMES DAILY
Qty: 30 CAPSULE | Refills: 0 | Status: SHIPPED | OUTPATIENT
Start: 2023-10-01 | End: 2023-10-11

## 2023-10-01 RX ORDER — KETOROLAC TROMETHAMINE 15 MG/ML
30 INJECTION, SOLUTION INTRAMUSCULAR; INTRAVENOUS ONCE
Status: COMPLETED | OUTPATIENT
Start: 2023-10-01 | End: 2023-10-01

## 2023-10-01 NOTE — ED INITIAL ASSESSMENT (HPI)
Tooth abscess for months. States she is having a lot of pain. States tooth is gone and it is now into the root.

## 2023-10-01 NOTE — ED QUICK NOTES
Pt c/o right sided tooth pain, radiating to her ear. Pt has had trouble getting treatment since moving from [place] and insurance issues. Pt is a&ox3. Speaking clearly. Respirations equal and nonlabored. Pt is a&ox3.

## 2023-10-02 ENCOUNTER — TELEPHONE (OUTPATIENT)
Dept: FAMILY MEDICINE CLINIC | Facility: CLINIC | Age: 58
End: 2023-10-02

## 2023-10-03 RX ORDER — CLONAZEPAM 1 MG/1
1 TABLET ORAL 2 TIMES DAILY
Qty: 28 TABLET | Refills: 0 | Status: SHIPPED | OUTPATIENT
Start: 2023-10-03

## 2023-10-03 NOTE — TELEPHONE ENCOUNTER
Pt's sister, Natividad Kirkpatrick, calling to get a refill of her sister's clonazepam. Says she takes 1mg BID and ran out today. Took 1# this AM, but will be without tonight. I advised her to call office in AM and make sure the message has been received. Please address refill as appropriate to avoid pt having withdrawal tomorrow. Says she shakes a lot when she is without.

## 2023-10-06 DIAGNOSIS — F31.9 BIPOLAR 1 DISORDER (HCC): ICD-10-CM

## 2023-10-09 RX ORDER — HALOPERIDOL 5 MG/1
5 TABLET ORAL EVERY MORNING
Qty: 14 TABLET | Refills: 0 | Status: SHIPPED | OUTPATIENT
Start: 2023-10-09 | End: 2023-10-23

## 2023-10-09 NOTE — TELEPHONE ENCOUNTER
Has the patient been seen by her psychiatrist yet? All her psych medications should be refilled by her psychiatrist. She was going to see someone through the [facility] and her sister has been helping her.

## 2023-10-09 NOTE — TELEPHONE ENCOUNTER
Medication(s) to Refill:   Requested Prescriptions     Pending Prescriptions Disp Refills    HALOPERIDOL 5 MG Oral Tab [Pharmacy Med Name: HALOPERIDOL 5MG (FIVE MG) TABS] 14 tablet 0     Sig: TAKE 1 TABLET(5 MG) BY MOUTH EVERY MORNING     Last Time Medication was Filled:  9/21/23    Recent Visits  Date Type Provider Dept   09/05/23 Office Visit Ladi Vargas DO [facility]     Future Appointments  No visits were found

## 2023-10-16 ENCOUNTER — TELEPHONE (OUTPATIENT)
Dept: FAMILY MEDICINE CLINIC | Facility: CLINIC | Age: 58
End: 2023-10-16

## 2023-10-16 ENCOUNTER — MED REC SCAN ONLY (OUTPATIENT)
Dept: FAMILY MEDICINE CLINIC | Facility: CLINIC | Age: 58
End: 2023-10-16

## 2023-10-16 NOTE — TELEPHONE ENCOUNTER
Received fax on 10/12/2023 requesting medical records. Requesting medical records for dates 10/11/23 to 10/11/24. Name: [facility] Dept   Address: [address] [address]  [facility]: [phone]  Fax: [phone]    Original sent to Scan [employer], copy sent to medical records.  Red Tag # [id number]

## 2023-10-23 DIAGNOSIS — F31.9 BIPOLAR 1 DISORDER (HCC): ICD-10-CM

## 2023-10-23 RX ORDER — HALOPERIDOL 5 MG/1
5 TABLET ORAL EVERY MORNING
Qty: 14 TABLET | Refills: 0 | Status: SHIPPED | OUTPATIENT
Start: 2023-10-23

## 2023-10-23 NOTE — TELEPHONE ENCOUNTER
Please speak with the patient's sister. The patient was supposed to establish with a psychiatrist who is supposed to prescribe all her psych meds. I will refill one last time.

## 2023-10-25 DIAGNOSIS — E78.2 MIXED HYPERLIPIDEMIA: ICD-10-CM

## 2023-10-25 RX ORDER — ATORVASTATIN CALCIUM 20 MG/1
20 TABLET, FILM COATED ORAL NIGHTLY
Qty: 90 TABLET | Refills: 1 | Status: SHIPPED | OUTPATIENT
Start: 2023-10-25

## 2023-10-25 RX ORDER — ATORVASTATIN CALCIUM 20 MG/1
20 TABLET, FILM COATED ORAL NIGHTLY
Qty: 90 TABLET | Refills: 1 | OUTPATIENT
Start: 2023-10-25

## 2023-10-25 NOTE — TELEPHONE ENCOUNTER
Notified sister of last fill. She advised that care has been established with psychiatrist Dr Tony Canchola. Dr Ludwig Mirza is on vacation and they have  been unable to get refill.

## 2023-10-31 RX ORDER — METHOCARBAMOL 500 MG/1
500 TABLET, FILM COATED ORAL 3 TIMES DAILY PRN
Qty: 90 TABLET | Refills: 0 | Status: SHIPPED | OUTPATIENT
Start: 2023-10-31

## 2023-10-31 NOTE — TELEPHONE ENCOUNTER
Lester Reich requesting medication refill for methocarbamol 500 MG Oral Tab // stool softer// ARIPiprazole 20 MG Oral Tab     Patient Contacted Pharmacy (Yes/No): [facility]  LOV: 10/5/2023   Last Refill Date: 09/25/2023  30 or 90 Day Supply:  Pharmacy Location: [facility]   Prescribed By: Dr. Willie Oconnell   Last dose of methocarbamol 500 MG Oral Tab was this morning. Informed patient to allow up to 24 to 48 Business hours for a call back from a nurse.

## 2023-11-03 ENCOUNTER — OFFICE VISIT (OUTPATIENT)
Facility: CLINIC | Age: 58
End: 2023-11-03

## 2023-11-03 ENCOUNTER — TELEPHONE (OUTPATIENT)
Facility: CLINIC | Age: 58
End: 2023-11-03

## 2023-11-03 VITALS
WEIGHT: 240 LBS | DIASTOLIC BLOOD PRESSURE: 73 MMHG | SYSTOLIC BLOOD PRESSURE: 111 MMHG | HEART RATE: 80 BPM | HEIGHT: 64 IN | BODY MASS INDEX: 40.97 KG/M2

## 2023-11-03 DIAGNOSIS — Z80.0 FAMILY HISTORY OF COLON CANCER: ICD-10-CM

## 2023-11-03 DIAGNOSIS — Z12.11 COLON CANCER SCREENING: Primary | ICD-10-CM

## 2023-11-03 DIAGNOSIS — R13.19 ESOPHAGEAL DYSPHAGIA: Primary | ICD-10-CM

## 2023-11-03 DIAGNOSIS — Z12.11 COLON CANCER SCREENING: ICD-10-CM

## 2023-11-03 DIAGNOSIS — Z86.010 PERSONAL HISTORY OF COLONIC POLYPS: ICD-10-CM

## 2023-11-03 PROCEDURE — 99244 OFF/OP CNSLTJ NEW/EST MOD 40: CPT | Performed by: PHYSICIAN ASSISTANT

## 2023-11-03 PROCEDURE — 3008F BODY MASS INDEX DOCD: CPT | Performed by: PHYSICIAN ASSISTANT

## 2023-11-03 PROCEDURE — 3078F DIAST BP <80 MM HG: CPT | Performed by: PHYSICIAN ASSISTANT

## 2023-11-03 PROCEDURE — 3074F SYST BP LT 130 MM HG: CPT | Performed by: PHYSICIAN ASSISTANT

## 2023-11-03 RX ORDER — POLYETHYLENE GLYCOL 3350, SODIUM CHLORIDE, SODIUM BICARBONATE, POTASSIUM CHLORIDE 420; 11.2; 5.72; 1.48 G/4L; G/4L; G/4L; G/4L
POWDER, FOR SOLUTION ORAL
Qty: 1 EACH | Refills: 0 | Status: SHIPPED | OUTPATIENT
Start: 2023-11-03

## 2023-11-03 NOTE — H&P
[facility] - Gastroenterology                                                                                                               Reason for consult: Patient presents with:  Dysphagia      Requesting physician or provider: Prabhu Leal DO      HPI:   David Young is a 66year old year-old female with history of HLD, asthma, hypothyroidism, bipolar, GERD, RA who presents for dysphagia. Sister helps with history. Dysphagia- Began about 1 year ago. She notes it is hard to swallow, but is also having increased difficulty with her teeth. She is having an extraction in next 2 weeks. She often can have globus with large foods. She denies odynophagia. She notes GERD for over 20 years. She is on omeprazole. The medicine does help to control to symptoms. Tries to monitor spicy foods. No nausea or vomiting. No abdominal pain. No weight loss. Appetite is decreased due to fear of getting food stuck in throat. CRC- Patient last cln over 10 years ago. Brother newly diagnosed with colon cancer in last year. She can tend to have constipation. Uses a colon cleanse on occasion to assist with bowel habits. Can have straining and incomplete evacuation with bowel movements. Denies brbpr or melena.      NSAIDS: daily ibuprofen for months  Tobacco: none  Alcohol: none  Marijuana: none  Illicit drugs: none    FH GI malignancy- colon cancer brother  FH celiac dz- none  FH liver dz- none  FH IBD- none    No history of adverse reaction to sedation  No CARLENE  No anticoagulants  No pacemaker/defibrillator  No pain medications and/or sleep aides      Last colonoscopy: 10 years ago  Last EGD: none    Wt Readings from Last 6 Encounters:  11/03/23 : 240 lb (108.9 kg)  10/01/23 : 225 lb (102.1 kg)  09/18/23 : 230 lb (104.3 kg)  09/08/23 : 223 lb (101.2 kg)  09/05/23 : 223 lb (101.2 kg)  08/24/23 : 220 lb (99.8 kg)       History, Medications, Allergies, ROS: Past Medical History:   Diagnosis Date    Acquired hypothyroidism 09/05/2023    Allergic rhinitis 09/05/2023    Bipolar affective ([facility] [address]     Chronic low back pain without sciatica 09/05/2023    Depression     Gastroesophageal reflux disease 09/05/2023    Mild intermittent asthma without complication 22/64/[birth year]76    PTSD (post-traumatic stress disorder) 09/05/2023    PUD (peptic ulcer disease)     age 14    Rheumatoid arthritis ([facility] [address] 09/05/2023      No past surgical history on file. Family Hx:   Family History   Problem Relation Age of Onset    COPD Father     Heart Attack Father     Uterine Cancer Sister       Social History:   Social History     Socioeconomic History    Marital status: Legally    Tobacco Use    Smoking status: Never    Smokeless tobacco: Never   Substance and Sexual Activity    Alcohol use: Not Currently    Drug use: Not Currently     Types: Cocaine     Comment: \"occasionally at parties or whatever\"        Medications (Active prior to today's visit):  Current Outpatient Medications   Medication Sig Dispense Refill    PEG 3350-KCl-Na Bicarb-NaCl (TRILYTE) 420 g Oral Recon Soln Take prep as directed by gastro office. May substitute with Trilyte/generic equivalent if needed. 1 each 0    clindamycin 300 MG Oral Cap Take 1 capsule (300 mg total) by mouth 3 (three) times daily. methocarbamol 500 MG Oral Tab Take 1 tablet (500 mg total) by mouth 3 (three) times daily as needed. 90 tablet 0    ATORVASTATIN 20 MG Oral Tab TAKE 1 TABLET(20 MG) BY MOUTH EVERY NIGHT 90 tablet 1    HALOPERIDOL 5 MG Oral Tab TAKE 1 TABLET(5 MG) BY MOUTH EVERY MORNING 14 tablet 0    clonazePAM 1 MG Oral Tab Take 1 tablet (1 mg total) by mouth 2 (two) times daily. 28 tablet 0    hydrOXYzine 50 MG Oral Tab Take 1 tablet (50 mg total) by mouth 3 (three) times daily as needed for Itching. 90 tablet 1    haloperidol 10 MG Oral Tab Take 1 tablet (10 mg total) by mouth at bedtime.  14 tablet 0    DULoxetine 30 MG Oral Cap DR Particles Take 1 capsule (30 mg total) by mouth daily. 90 capsule 0    omeprazole 20 MG Oral Capsule Delayed Release Take 1 capsule (20 mg total) by mouth every morning before breakfast. 90 capsule 1    lithium carbonate 300 MG Oral Cap Take 1 capsule (300 mg total) by mouth 2 (two) times daily with meals. 180 capsule 0    ARIPiprazole 20 MG Oral Tab Take 1 tablet (20 mg total) by mouth daily. 90 tablet 0    Amitriptyline HCl 150 MG Oral Tab Take 1 tablet (150 mg total) by mouth nightly. 90 tablet 0    levothyroxine 75 MCG Oral Tab Take 1 tablet (75 mcg total) by mouth before breakfast. 90 tablet 0    albuterol 108 (90 Base) MCG/ACT Inhalation Aero Soln Inhale 2 puffs into the lungs every 4 (four) hours as needed for Wheezing or Shortness of Breath. 1 each 0    ketoconazole 2 % External Cream Apply to the affected area once daily. 30 g 1    docusate sodium 250 MG Oral Cap Take 1 capsule (250 mg total) by mouth daily. cholecalciferol 50 MCG (2000 UT) Oral Tab Take 1 tablet (2,000 Units total) by mouth daily. benztropine 1 MG Oral Tab Take 1 tablet (1 mg total) by mouth 2 (two) times daily. cetirizine 5 MG Oral Tab Take 1 tablet (5 mg total) by mouth daily.          Allergies:    Bactrim [Sulfametho*    RASH  Paxil [Paroxetine]      RASH  Penicillins             RASH  Sulfa Antibiotics       RASH    ROS:   CONSTITUTIONAL: negative for fevers, chills, sweats and weight loss  EYES Negative for red eyes, yellow eyes, changes in vision  HEENT: Negative for dysphagia and hoarseness  RESPIRATORY: Negative for cough and shortness of breath  CARDIOVASCULAR: Negative for chest pain, palpitations  GASTROINTESTINAL: See HPI  GENITOURINARY: Negative for dysuria and frequency  MUSCULOSKELETAL: Negative for arthralgias and myalgias  NEUROLOGICAL: Negative for dizziness and headaches  BEHAVIOR/PSYCH: Negative for anxiety and poor appetite    PHYSICAL EXAM:   Blood pressure 111/73, pulse 80, height 5' 4\" (1.626 m), weight 240 lb (108.9 kg), last menstrual period 08/28/2023. GEN: WD/WN, NAD  HEENT: Supple symmetrical, trachea midline  CV: RRR, the extremities are warm and well perfused   LUNGS: No increased work of breathing  ABDOMEN: No scars, normal bowel sounds, soft, non-tender, non-distended no rebound or guarding, no masses, no hepatomegaly  MSK: No redness, no warmth, no swelling of joints  SKIN: No jaundice, no erythema, no rashes  HEMATOLOGIC: No bleeding, no bruising  NEURO: Alert and interactive, normal gait    Labs/Imaging/Procedures:     Patient's pertinent labs and imaging were reviewed and discussed with patient today. Lab Results   Component Value Date    WBC 11.6 (H) 09/07/2023    RBC 4.13 09/07/2023    HGB 12.1 09/07/2023    HCT 38.6 09/07/2023    MCV 93.5 09/07/2023    MCH 29.3 09/07/2023    MCHC 31.3 09/07/2023    RDW 12.5 09/07/2023    .0 09/07/2023        Lab Results   Component Value Date     (H) 09/07/2023    BUN 11 09/07/2023    CREATSERUM 1.06 (H) 09/07/2023    ANIONGAP 5 09/07/2023    CA 9.4 09/07/2023    OSMOCALC 289 09/07/2023    ALKPHO 78 09/07/2023    AST 18 09/07/2023    ALT 31 09/07/2023    BILT 0.4 09/07/2023    TP 8.1 09/07/2023    ALB 3.6 09/07/2023    GLOBULIN 4.5 (H) 09/07/2023     09/07/2023    K 3.9 09/07/2023     09/07/2023    CO2 29.0 09/07/2023        No results found. .  ASSESSMENT/PLAN:   Naina Jo is a 66year old year-old female with history of HLD, asthma, hypothyroidism, bipolar, GERD, RA who presents for dysphagia. #dysphagia  #GERD  Patient notes dysphagia over last year. Also is working with dentist for poor dentition. She notes can have globus. No odynophagia. No nausea, vomiting. No weight loss. Discussed dysphagia could be due to poor dentition, GERD, hiatal hernia, stricture or esophagitis. Advised for lifestyle changes, increasing acid reducer, xr esophogram and follow up in 6 weeks.      #crc screening  Patient last cln 10 years ago. Notes new family hx of colon cancer with brother. Notes history of constipation. Can have increased straining and incomplete evacuation. Denies brbpr or melena. Denies lower abdominal pain. Recommendations:  GERD  - increase omeprazole 20 mg to twice a day 30 mins prior to meal  - XR esophagram     -Chew foods carefully prior to swallowing  -Increase water intake to 8 (8oz) glasses per day  -Decrease food triggers (Spicy foods, milk products near end of day, chocolate and fatty foods)  -Avoid aspirin, NSAIDs (i.e. Ibuprofen, motrin, Advil, Aleeve, naproxen), caffeine, alcohol, tobacco  -Avoid eating meals 3 hours before bed time  -Avoid wearing tight fitting clothes  -Elevate head of bed to 30 degrees with blocks under legs at head of bed   - follow up with Meghan in 6- 8 weeks    Constipation  -Start Miralax 17g per day, titrate up or down for ideal frequency  -Increase water intake to 64oz of water per day  -Increase fiber to 20-30 g per day with fruits, vegetables and whole grains  -Increase exercise to 150 mins/week  -Do not ignore urge to defecate  -Avoid artifical sugars  -If bowel habits change or constipation worsens, call our office sooner     1. Schedule colonoscopy with Dr. Audra Hodges or Dr. Matthew Sood with [facility] [Diagnosis: crc screening, family hx of colon cancer]    2.  bowel prep from pharmacy (split trilyte)    3. Continue all medications as normal for your procedure. 4. Read all bowel prep instructions carefully. Bowel prep instructions can also be found online at:  www.[facility].org/giprep     5. AVOID seeds, nuts, popcorn, raw fruits and vegetables for 3 days before procedure    6. You MAY need to go for COVID testing 72 hours before procedure. The testing team will call you a few days before your procedure to discuss with you if testing is required. If you are asked to go for COVID testing and do not completed the test, the procedure cannot be performed.      7. If you start any NEW medication after your visit today, please notify us. Certain medications (like iron or weight loss medications) will need to be held before the procedure, or the procedure cannot be performed safely. Orders This Visit:  No orders of the defined types were placed in this encounter. Meds This Visit:  Requested Prescriptions     Signed Prescriptions Disp Refills    PEG 3350-KCl-Na Bicarb-NaCl (TRILYTE) 420 g Oral Recon Soln 1 each 0     Sig: Take prep as directed by gastro office. May substitute with Trilyte/generic equivalent if needed. Imaging & Referrals:  XR ESOPHAGUS SINGLE CONTRAST (CPT=74220)    ENDOSCOPIC RISK BENEFIT DISCUSSION: I described the procedure in great detail with the patient. I discussed the risks and benefits, including but not limited to: bleeding, perforation, infection, anesthesia complications, and even death. Patient will be NPO after midnight and will have a person physically present at time of pick-up to drive patient home. Patient verbalized understanding and agrees to proceed with procedure as planned. Alejandra Lassiter PA-C   11/3/2023        This note was partially prepared using [employer] voice recognition dictation software. As a result, errors may occur. When identified, these errors have been corrected.  While every attempt is made to correct errors during dictation, discrepancies may still exist.

## 2023-11-03 NOTE — PATIENT INSTRUCTIONS
Recommendations:  GERD  - increase omeprazole 20 mg to twice a day 30 mins prior to meal  - XR esophagram     -Chew foods carefully prior to swallowing  -Increase water intake to 8 (8oz) glasses per day  -Decrease food triggers (Spicy foods, milk products near end of day, chocolate and fatty foods)  -Avoid aspirin, NSAIDs (i.e. Ibuprofen, motrin, Advil, Aleeve, naproxen), caffeine, alcohol, tobacco  -Avoid eating meals 3 hours before bed time  -Avoid wearing tight fitting clothes  -Elevate head of bed to 30 degrees with blocks under legs at head of bed   - follow up with Meghan in 6- 8 weeks    Constipation  -Start Miralax 17g per day, titrate up or down for ideal frequency  -Increase water intake to 64oz of water per day  -Increase fiber to 20-30 g per day with fruits, vegetables and whole grains  -Increase exercise to 150 mins/week  -Do not ignore urge to defecate  -Avoid artifical sugars  -If bowel habits change or constipation worsens, call our office sooner     1. Schedule colonoscopy with Dr. Kishor Mirza or Dr. Iveth Spencer with [facility] [Diagnosis: crc screening, family hx of colon cancer]    2.  bowel prep from pharmacy (split trilyte)    3. Continue all medications as normal for your procedure. 4. Read all bowel prep instructions carefully. Bowel prep instructions can also be found online at:  www.[facility].org/giprep     5. AVOID seeds, nuts, popcorn, raw fruits and vegetables for 3 days before procedure    6. You MAY need to go for COVID testing 72 hours before procedure. The testing team will call you a few days before your procedure to discuss with you if testing is required. If you are asked to go for COVID testing and do not completed the test, the procedure cannot be performed. 7. If you start any NEW medication after your visit today, please notify us. Certain medications (like iron or weight loss medications) will need to be held before the procedure, or the procedure cannot be performed safely.

## 2023-11-03 NOTE — TELEPHONE ENCOUNTER
Scheduled for: Colonoscopy 11046/17325   Provider Name: Dr Nickolas Menjivar  Date: Wed 4/24/2024   Location: [facility]   Sedation: MAC  Time: 12:30 pm   Prep: split trilyte   Meds/Allergies Reconciled?: Reviewed by provider   Diagnosis with codes: colon screening Z12.11, Family Hx colon cancer Z80.0   Was patient informed to call insurance with codes (Y/N): Yes   Referral sent?:  Yes  [facility] or [address] [place] [address] notified?: I sent an electronic request to Endo Scheduling and received a confirmation today.      Medication Orders:  Pt is aware to NOT take iron pills, herbal meds and diet supplements for 7 days before exam. Also to NOT take any form of alcohol, recreational drugs and any forms of ED meds 24 hours before exam.     Misc Orders:       Further instructions given by staff: Pt is aware to NOT take iron pills, herbal meds and diet supplements for 7 days before exam. Also to NOT take any form of alcohol, recreational drugs 24 hrs and any forms of ED meds 72 hours before exam.

## 2023-11-09 RX ORDER — DOCUSATE SODIUM 250 MG
250 CAPSULE ORAL DAILY
Qty: 90 CAPSULE | Refills: 3 | Status: SHIPPED | OUTPATIENT
Start: 2023-11-09

## 2023-11-14 ENCOUNTER — TELEPHONE (OUTPATIENT)
Facility: CLINIC | Age: 58
End: 2023-11-14

## 2023-11-14 RX ORDER — TRIAMCINOLONE ACETONIDE 1 MG/G
1 CREAM TOPICAL 2 TIMES DAILY
Qty: 1 EACH | Refills: 0 | Status: SHIPPED | OUTPATIENT
Start: 2023-11-14 | End: 2023-11-29

## 2023-11-14 NOTE — TELEPHONE ENCOUNTER
Patient's sister calling regards having issues swallowing unsure of what to do and states also calling regards possible EGD please call.

## 2023-11-14 NOTE — TELEPHONE ENCOUNTER
Uche Christianson,     Called patient, name/ verified, also spoke (mostly) with sister Jose Raul Dumont (on [place]). Jose Raul Dumont stated that last night Bran Velez feels that pt's pills got stuck in her throat, pt panicked and vomited x1, emesis was with small amount of blood. Pt took omeprazole prior to this episode. Pt is not in any distress at present. Pt went to [facility] last Friday for balance problems and other issues, was told that there was no bed. She was examined there and was told that the upper part of her mouth was swollen so she was prescribed Clindamycin TID for 7 days, pt started this ABX last Saturday. She is scheduled for tooth extraction (2 molars, lower) this coming Thursday. Last OV with you 11/3/23, noted ongoing dysphagia. Reviewed your recommendations with Jose Raulallan Dumont, discussed for pt to take omeprazole bid 30 mins ac breakfast and  dinner and to call central scheduling (contact number provided) to schedule for xr esophagram.     Swallow precautions also dicussed with Jose Raul Dumont: pt to eat slowly and chew food thoroughly, small bites of food, chin tuck technique with double swallows, sitting upright when taking anything by mouth, HOB up 30 degrees. ER precautions also discussed with Jose Raul Dumont. She verbalized understanding. Jose Raul Dumont is asking if pt can have EGD. She is scheduled for colonoscopy on 24 with Dr. Hank Reese currently. I discussed with Jose Raul Julia importance of completing xr esophagram first to help with decision for egd or per REHABILITATION [facility]. She verbalized understanding. Please advise. Thank you.

## 2023-11-14 NOTE — TELEPHONE ENCOUNTER
Germán Rader requesting medication refill for triamcinolone 0.1 % External Cream .    Patient [address] (Yes/No): NO   LOV: 10/5/2023   Last Refill Date: 8/24/2023  30 or 90 Day Supply:  Pharm[facility] Location: [facility]   Prescribed By: Dr. Kan Field       Patient was prescribed creme for itchy skin would like to get a refill on medication. Informed patient to allow up to 24 to 48 Business hours for a call back from a nurse.

## 2023-11-15 NOTE — TELEPHONE ENCOUNTER
Agree with plan. Patient should complete XR esophogram first. If any worsening episodes of dysphagia she should be seen at the ER.      Julien Hung PA-C

## 2023-11-16 ENCOUNTER — OFFICE VISIT (OUTPATIENT)
Dept: FAMILY MEDICINE CLINIC | Facility: CLINIC | Age: 58
End: 2023-11-16
Payer: MEDICAID

## 2023-11-16 VITALS
DIASTOLIC BLOOD PRESSURE: 54 MMHG | TEMPERATURE: 98 F | OXYGEN SATURATION: 99 % | HEART RATE: 86 BPM | WEIGHT: 240 LBS | RESPIRATION RATE: 18 BRPM | HEIGHT: 64 IN | SYSTOLIC BLOOD PRESSURE: 98 MMHG | BODY MASS INDEX: 40.97 KG/M2

## 2023-11-16 DIAGNOSIS — M54.50 CHRONIC BILATERAL LOW BACK PAIN WITHOUT SCIATICA: Primary | ICD-10-CM

## 2023-11-16 DIAGNOSIS — M79.672 BILATERAL FOOT PAIN: ICD-10-CM

## 2023-11-16 DIAGNOSIS — E03.9 ACQUIRED HYPOTHYROIDISM: ICD-10-CM

## 2023-11-16 DIAGNOSIS — L23.9 ALLERGIC DERMATITIS: ICD-10-CM

## 2023-11-16 DIAGNOSIS — M79.671 BILATERAL FOOT PAIN: ICD-10-CM

## 2023-11-16 DIAGNOSIS — M06.9 RHEUMATOID ARTHRITIS, INVOLVING UNSPECIFIED SITE, UNSPECIFIED WHETHER RHEUMATOID FACTOR PRESENT (HCC): ICD-10-CM

## 2023-11-16 DIAGNOSIS — R73.03 PREDIABETES: ICD-10-CM

## 2023-11-16 DIAGNOSIS — F31.9 BIPOLAR 1 DISORDER (HCC): ICD-10-CM

## 2023-11-16 DIAGNOSIS — G89.29 CHRONIC BILATERAL LOW BACK PAIN WITHOUT SCIATICA: Primary | ICD-10-CM

## 2023-11-16 DIAGNOSIS — R13.10 DYSPHAGIA, UNSPECIFIED TYPE: ICD-10-CM

## 2023-11-16 DIAGNOSIS — R41.3 MEMORY LOSS: ICD-10-CM

## 2023-11-16 DIAGNOSIS — L20.84 INTRINSIC ECZEMA: ICD-10-CM

## 2023-11-16 DIAGNOSIS — E78.2 MIXED HYPERLIPIDEMIA: ICD-10-CM

## 2023-11-16 DIAGNOSIS — R26.81 GAIT INSTABILITY: ICD-10-CM

## 2023-11-16 LAB
EST. AVERAGE GLUCOSE BLD GHB EST-MCNC: 137 MG/DL (ref 68–126)
HBA1C MFR BLD: 6.4 % (ref ?–5.7)

## 2023-11-16 PROCEDURE — 3074F SYST BP LT 130 MM HG: CPT | Performed by: FAMILY MEDICINE

## 2023-11-16 PROCEDURE — 84439 ASSAY OF FREE THYROXINE: CPT | Performed by: FAMILY MEDICINE

## 2023-11-16 PROCEDURE — 80053 COMPREHEN METABOLIC PANEL: CPT | Performed by: FAMILY MEDICINE

## 2023-11-16 PROCEDURE — 80061 LIPID PANEL: CPT | Performed by: FAMILY MEDICINE

## 2023-11-16 PROCEDURE — 3008F BODY MASS INDEX DOCD: CPT | Performed by: FAMILY MEDICINE

## 2023-11-16 PROCEDURE — 99417 PROLNG OP E/M EACH 15 MIN: CPT | Performed by: FAMILY MEDICINE

## 2023-11-16 PROCEDURE — 99215 OFFICE O/P EST HI 40 MIN: CPT | Performed by: FAMILY MEDICINE

## 2023-11-16 PROCEDURE — 84443 ASSAY THYROID STIM HORMONE: CPT | Performed by: FAMILY MEDICINE

## 2023-11-16 PROCEDURE — 3078F DIAST BP <80 MM HG: CPT | Performed by: FAMILY MEDICINE

## 2023-11-16 PROCEDURE — 83036 HEMOGLOBIN GLYCOSYLATED A1C: CPT | Performed by: FAMILY MEDICINE

## 2023-11-16 RX ORDER — METHOCARBAMOL 500 MG/1
500 TABLET, FILM COATED ORAL 3 TIMES DAILY PRN
Qty: 270 TABLET | Refills: 1 | Status: SHIPPED | OUTPATIENT
Start: 2023-11-16

## 2023-11-16 RX ORDER — CHLORHEXIDINE GLUCONATE ORAL RINSE 1.2 MG/ML
SOLUTION DENTAL
COMMUNITY
Start: 2023-11-11

## 2023-11-16 NOTE — PATIENT INSTRUCTIONS
Make an appointment with the ophthalmologist, Dr. Anette Mccarthy for further evaluation of your vision. Make an appointment with the podiatrist, Dr. Ayanna Harvey, for further evaluation of your bilateral foot pain. Make an appointment with your neurologist, , for further evaluation of your memory loss. Make an appointment with physical therapy to help with your gait. Keep your appointment with the rheumatologist as scheduled on 11/22/2023. Schedule your esophageal swallowing x-ray study as soon as possible. You can get your flu shot in the next 1 to 2 weeks at your local pharmacy. Continue with your PHP program through [facility].    Keep your next appointment with your psychiatrist.    I will contact you with your test results once available.

## 2023-11-16 NOTE — TELEPHONE ENCOUNTER
I reviewed below complete message from [place] with sister Rudy Chappelljennifer. She voiced understanding.   She wrote down the number to central scheduling that I gave her today and she will call to set up the XR esophagram.

## 2023-11-17 DIAGNOSIS — R79.89 ABNORMAL BLOOD CREATININE LEVEL: Primary | ICD-10-CM

## 2023-11-17 DIAGNOSIS — E03.9 ACQUIRED HYPOTHYROIDISM: ICD-10-CM

## 2023-11-17 PROBLEM — R73.03 PREDIABETES: Status: ACTIVE | Noted: 2023-11-17

## 2023-11-17 LAB
ALBUMIN SERPL-MCNC: 3.7 G/DL (ref 3.4–5)
ALBUMIN/GLOB SERPL: 0.8 {RATIO} (ref 1–2)
ALP LIVER SERPL-CCNC: 78 U/L
ALT SERPL-CCNC: 26 U/L
ANION GAP SERPL CALC-SCNC: 5 MMOL/L (ref 0–18)
AST SERPL-CCNC: 25 U/L (ref 15–37)
BILIRUB SERPL-MCNC: 0.3 MG/DL (ref 0.1–2)
BUN BLD-MCNC: 12 MG/DL (ref 9–23)
CALCIUM BLD-MCNC: 10 MG/DL (ref 8.5–10.1)
CHLORIDE SERPL-SCNC: 103 MMOL/L (ref 98–112)
CHOLEST SERPL-MCNC: 208 MG/DL (ref ?–200)
CO2 SERPL-SCNC: 26 MMOL/L (ref 21–32)
CREAT BLD-MCNC: 1.15 MG/DL
EGFRCR SERPLBLD CKD-EPI 2021: 56 ML/MIN/1.73M2 (ref 60–?)
FASTING PATIENT LIPID ANSWER: YES
FASTING STATUS PATIENT QL REPORTED: YES
GLOBULIN PLAS-MCNC: 4.6 G/DL (ref 2.8–4.4)
GLUCOSE BLD-MCNC: 128 MG/DL (ref 70–99)
HDLC SERPL-MCNC: 54 MG/DL (ref 40–59)
LDLC SERPL CALC-MCNC: 101 MG/DL (ref ?–100)
NONHDLC SERPL-MCNC: 154 MG/DL (ref ?–130)
OSMOLALITY SERPL CALC.SUM OF ELEC: 279 MOSM/KG (ref 275–295)
POTASSIUM SERPL-SCNC: 4.1 MMOL/L (ref 3.5–5.1)
PROT SERPL-MCNC: 8.3 G/DL (ref 6.4–8.2)
SODIUM SERPL-SCNC: 134 MMOL/L (ref 136–145)
T4 FREE SERPL-MCNC: 0.7 NG/DL (ref 0.8–1.7)
TRIGL SERPL-MCNC: 313 MG/DL (ref 30–149)
TSI SER-ACNC: 34.3 MIU/ML (ref 0.36–3.74)
VLDLC SERPL CALC-MCNC: 53 MG/DL (ref 0–30)

## 2023-11-17 RX ORDER — LEVOTHYROXINE SODIUM 0.1 MG/1
100 TABLET ORAL
Qty: 90 TABLET | Refills: 0 | Status: SHIPPED | OUTPATIENT
Start: 2023-11-17 | End: 2024-02-15

## 2023-11-18 ENCOUNTER — APPOINTMENT (OUTPATIENT)
Dept: CT IMAGING | Facility: HOSPITAL | Age: 58
End: 2023-11-18
Attending: EMERGENCY MEDICINE
Payer: MEDICAID

## 2023-11-18 ENCOUNTER — HOSPITAL ENCOUNTER (EMERGENCY)
Facility: HOSPITAL | Age: 58
Discharge: HOME | End: 2023-11-19
Attending: EMERGENCY MEDICINE
Payer: MEDICAID

## 2023-11-18 ENCOUNTER — APPOINTMENT (OUTPATIENT)
Dept: GENERAL RADIOLOGY | Facility: HOSPITAL | Age: 58
End: 2023-11-18
Payer: MEDICAID

## 2023-11-18 DIAGNOSIS — R07.9 CHEST PAIN OF UNCERTAIN ETIOLOGY: Primary | ICD-10-CM

## 2023-11-18 DIAGNOSIS — R10.9 ABDOMINAL PAIN OF UNKNOWN ETIOLOGY: ICD-10-CM

## 2023-11-18 DIAGNOSIS — K21.9 GASTROESOPHAGEAL REFLUX DISEASE, UNSPECIFIED WHETHER ESOPHAGITIS PRESENT: ICD-10-CM

## 2023-11-18 LAB
ALBUMIN SERPL-MCNC: 4.2 G/DL (ref 3.4–5)
ALBUMIN/GLOB SERPL: 0.8 {RATIO} (ref 1–2)
ALP LIVER SERPL-CCNC: 92 U/L
ALT SERPL-CCNC: 29 U/L
ANION GAP SERPL CALC-SCNC: 4 MMOL/L (ref 0–18)
AST SERPL-CCNC: 25 U/L (ref 15–37)
BASOPHILS # BLD AUTO: 0.09 X10(3) UL (ref 0–0.2)
BASOPHILS NFR BLD AUTO: 0.8 %
BILIRUB SERPL-MCNC: 0.2 MG/DL (ref 0.1–2)
BUN BLD-MCNC: 12 MG/DL (ref 9–23)
CALCIUM BLD-MCNC: 10.3 MG/DL (ref 8.5–10.1)
CHLORIDE SERPL-SCNC: 101 MMOL/L (ref 98–112)
CO2 SERPL-SCNC: 29 MMOL/L (ref 21–32)
CREAT BLD-MCNC: 1.05 MG/DL
D DIMER PPP FEU-MCNC: <0.27 UG/ML FEU (ref ?–0.56)
EGFRCR SERPLBLD CKD-EPI 2021: 62 ML/MIN/1.73M2 (ref 60–?)
EOSINOPHIL # BLD AUTO: 0.38 X10(3) UL (ref 0–0.7)
EOSINOPHIL NFR BLD AUTO: 3.2 %
ERYTHROCYTE [DISTWIDTH] IN BLOOD BY AUTOMATED COUNT: 13.4 %
GLOBULIN PLAS-MCNC: 5.1 G/DL (ref 2.8–4.4)
GLUCOSE BLD-MCNC: 149 MG/DL (ref 70–99)
HCT VFR BLD AUTO: 38.8 %
HGB BLD-MCNC: 12.6 G/DL
IMM GRANULOCYTES # BLD AUTO: 0.06 X10(3) UL (ref 0–1)
IMM GRANULOCYTES NFR BLD: 0.5 %
LIPASE SERPL-CCNC: 22 U/L (ref 13–75)
LYMPHOCYTES # BLD AUTO: 2.49 X10(3) UL (ref 1–4)
LYMPHOCYTES NFR BLD AUTO: 21.2 %
MCH RBC QN AUTO: 29.1 PG (ref 26–34)
MCHC RBC AUTO-ENTMCNC: 32.5 G/DL (ref 31–37)
MCV RBC AUTO: 89.6 FL
MONOCYTES # BLD AUTO: 0.85 X10(3) UL (ref 0.1–1)
MONOCYTES NFR BLD AUTO: 7.2 %
NEUTROPHILS # BLD AUTO: 7.86 X10 (3) UL (ref 1.5–7.7)
NEUTROPHILS # BLD AUTO: 7.86 X10(3) UL (ref 1.5–7.7)
NEUTROPHILS NFR BLD AUTO: 67.1 %
OSMOLALITY SERPL CALC.SUM OF ELEC: 281 MOSM/KG (ref 275–295)
PLATELET # BLD AUTO: 392 10(3)UL (ref 150–450)
POTASSIUM SERPL-SCNC: 3.9 MMOL/L (ref 3.5–5.1)
PROT SERPL-MCNC: 9.3 G/DL (ref 6.4–8.2)
RBC # BLD AUTO: 4.33 X10(6)UL
SODIUM SERPL-SCNC: 134 MMOL/L (ref 136–145)
TROPONIN I SERPL HS-MCNC: 3 NG/L
TROPONIN I SERPL HS-MCNC: 4 NG/L
WBC # BLD AUTO: 11.7 X10(3) UL (ref 4–11)

## 2023-11-18 PROCEDURE — C9113 INJ PANTOPRAZOLE SODIUM, VIA: HCPCS | Performed by: EMERGENCY MEDICINE

## 2023-11-18 PROCEDURE — 85025 COMPLETE CBC W/AUTO DIFF WBC: CPT | Performed by: EMERGENCY MEDICINE

## 2023-11-18 PROCEDURE — 74177 CT ABD & PELVIS W/CONTRAST: CPT | Performed by: EMERGENCY MEDICINE

## 2023-11-18 PROCEDURE — 99285 EMERGENCY DEPT VISIT HI MDM: CPT

## 2023-11-18 PROCEDURE — 93005 ELECTROCARDIOGRAM TRACING: CPT

## 2023-11-18 PROCEDURE — 84484 ASSAY OF TROPONIN QUANT: CPT

## 2023-11-18 PROCEDURE — 71045 X-RAY EXAM CHEST 1 VIEW: CPT

## 2023-11-18 PROCEDURE — 93010 ELECTROCARDIOGRAM REPORT: CPT

## 2023-11-18 PROCEDURE — 83690 ASSAY OF LIPASE: CPT | Performed by: EMERGENCY MEDICINE

## 2023-11-18 PROCEDURE — 85025 COMPLETE CBC W/AUTO DIFF WBC: CPT

## 2023-11-18 PROCEDURE — 84484 ASSAY OF TROPONIN QUANT: CPT | Performed by: EMERGENCY MEDICINE

## 2023-11-18 PROCEDURE — 80053 COMPREHEN METABOLIC PANEL: CPT

## 2023-11-18 PROCEDURE — 96374 THER/PROPH/DIAG INJ IV PUSH: CPT

## 2023-11-18 PROCEDURE — 85379 FIBRIN DEGRADATION QUANT: CPT | Performed by: EMERGENCY MEDICINE

## 2023-11-18 PROCEDURE — 80053 COMPREHEN METABOLIC PANEL: CPT | Performed by: EMERGENCY MEDICINE

## 2023-11-18 RX ORDER — MAGNESIUM HYDROXIDE/ALUMINUM HYDROXICE/SIMETHICONE 120; 1200; 1200 MG/30ML; MG/30ML; MG/30ML
30 SUSPENSION ORAL ONCE
Status: COMPLETED | OUTPATIENT
Start: 2023-11-18 | End: 2023-11-18

## 2023-11-19 VITALS
BODY MASS INDEX: 40.97 KG/M2 | DIASTOLIC BLOOD PRESSURE: 79 MMHG | RESPIRATION RATE: 14 BRPM | WEIGHT: 240 LBS | HEART RATE: 82 BPM | SYSTOLIC BLOOD PRESSURE: 136 MMHG | TEMPERATURE: 98 F | HEIGHT: 64 IN | OXYGEN SATURATION: 98 %

## 2023-11-19 LAB
ATRIAL RATE: 70 BPM
ATRIAL RATE: 82 BPM
P AXIS: 56 DEGREES
P AXIS: 59 DEGREES
P-R INTERVAL: 168 MS
P-R INTERVAL: 176 MS
Q-T INTERVAL: 374 MS
Q-T INTERVAL: 398 MS
QRS DURATION: 88 MS
QRS DURATION: 94 MS
QTC CALCULATION (BEZET): 429 MS
QTC CALCULATION (BEZET): 436 MS
R AXIS: 30 DEGREES
R AXIS: 40 DEGREES
T AXIS: 57 DEGREES
T AXIS: 71 DEGREES
VENTRICULAR RATE: 70 BPM
VENTRICULAR RATE: 82 BPM

## 2023-11-19 PROCEDURE — 93005 ELECTROCARDIOGRAM TRACING: CPT

## 2023-11-19 RX ORDER — SUCRALFATE 1 G/1
1 TABLET ORAL
Qty: 30 TABLET | Refills: 0 | Status: SHIPPED | OUTPATIENT
Start: 2023-11-19

## 2023-11-19 NOTE — DISCHARGE INSTRUCTIONS
Increase your omeprazole to twice a day, clear liquids no Motrin products follow-up with your primary care physician for outpatient stress test.    You were seen in the emergency room in a limited time. There is a possibility that although we do not see any acute process at this present time that things can change with time. Is therefore imperative that you follow-up with primary care physician for close follow-up. If there is any significant progression of your pain  or other symptoms you to return immediately to the emergency room.

## 2023-11-22 ENCOUNTER — OFFICE VISIT (OUTPATIENT)
Dept: RHEUMATOLOGY | Facility: CLINIC | Age: 58
End: 2023-11-22
Payer: MEDICAID

## 2023-11-22 VITALS
BODY MASS INDEX: 40.87 KG/M2 | DIASTOLIC BLOOD PRESSURE: 66 MMHG | OXYGEN SATURATION: 94 % | TEMPERATURE: 97 F | SYSTOLIC BLOOD PRESSURE: 122 MMHG | HEIGHT: 64 IN | RESPIRATION RATE: 16 BRPM | HEART RATE: 83 BPM | WEIGHT: 239.38 LBS

## 2023-11-22 DIAGNOSIS — M25.552 BILATERAL HIP PAIN: ICD-10-CM

## 2023-11-22 DIAGNOSIS — M79.7 FIBROMYALGIA: ICD-10-CM

## 2023-11-22 DIAGNOSIS — R70.0 ELEVATED SED RATE: ICD-10-CM

## 2023-11-22 DIAGNOSIS — Z11.59 NEED FOR HEPATITIS B SCREENING TEST: ICD-10-CM

## 2023-11-22 DIAGNOSIS — Z11.59 NEED FOR HEPATITIS C SCREENING TEST: ICD-10-CM

## 2023-11-22 DIAGNOSIS — M25.551 BILATERAL HIP PAIN: ICD-10-CM

## 2023-11-22 DIAGNOSIS — Z11.1 SCREENING FOR TUBERCULOSIS: ICD-10-CM

## 2023-11-22 DIAGNOSIS — M54.6 THORACIC SPINE PAIN: ICD-10-CM

## 2023-11-22 DIAGNOSIS — M06.00 SERONEGATIVE RHEUMATOID ARTHRITIS (HCC): Primary | ICD-10-CM

## 2023-11-22 DIAGNOSIS — R26.81 GAIT INSTABILITY: ICD-10-CM

## 2023-11-22 PROCEDURE — 3074F SYST BP LT 130 MM HG: CPT | Performed by: INTERNAL MEDICINE

## 2023-11-22 PROCEDURE — 3078F DIAST BP <80 MM HG: CPT | Performed by: INTERNAL MEDICINE

## 2023-11-22 PROCEDURE — 3008F BODY MASS INDEX DOCD: CPT | Performed by: INTERNAL MEDICINE

## 2023-11-22 PROCEDURE — 99204 OFFICE O/P NEW MOD 45 MIN: CPT | Performed by: INTERNAL MEDICINE

## 2023-11-24 ENCOUNTER — TELEPHONE (OUTPATIENT)
Facility: CLINIC | Age: 58
End: 2023-11-24

## 2023-11-24 DIAGNOSIS — K21.9 GASTROESOPHAGEAL REFLUX DISEASE, UNSPECIFIED WHETHER ESOPHAGITIS PRESENT: ICD-10-CM

## 2023-11-24 NOTE — TELEPHONE ENCOUNTER
Jez Turner sister called in to get refill pt is running low omeprazole 20 MG Oral Capsule Delayed Release please follow up

## 2023-11-24 NOTE — TELEPHONE ENCOUNTER
Meghan    Request received for omeprazole refill. Per last office visit note she was to increase to BID dosing-also see other TE from today patient recently in ER at [facility] with chest pain and per sister not cardiac so contacted Gi.     Thank you

## 2023-11-24 NOTE — TELEPHONE ENCOUNTER
Spoke with pt sister, Carlos James    Requesting your review from Sri's ER workup at [facility] on 11/18/2023    Pt went to the ER for a squeezing discomfort and pain in her chest and abdomen. Sister stated that pt was concerned she was having a heart attack which prompted the ER visit. After workup, it was declared that it is GI related (D/t cardiac markers and EKG being WNL)    Sister recalls being told possible esophageal diverticulum    Sister is aware that the pt is due for esophagram - so I gave her the number to central scheduling. The pt is currently in a day program at [facility] from 9-3:30    Her last OV: 11/03/2023    Pt is suffering from migraines and running nose. Pt took 4 ibuprofen's today. Advised the pt's sister that if those symptoms worsen, she can go to an urgent care to get tested for covid or the flu. The ER also advised the pt to cut down on ibuprofen and coffee, but the sister states tylenol doesn't work well. Please advise.  Thank you

## 2023-11-27 RX ORDER — OMEPRAZOLE 20 MG/1
20 CAPSULE, DELAYED RELEASE ORAL
Qty: 180 CAPSULE | Refills: 1 | Status: SHIPPED | OUTPATIENT
Start: 2023-11-27

## 2023-11-27 NOTE — TELEPHONE ENCOUNTER
Called and discussed patient with her sister Nilson Orosco. Patient had episode of globus and chest discomfort that sent her to the ER. Since ER visit, patient has been doing better. She is taking omeprazole 20 mg twice a day. Can find it hard to remember second dose. Patient has not scheduled esophogram at this time. Advised for patient to get esophogram done and continue PPI. Contact office with any worsening symptoms.      Alisa Casillas PA-C

## 2023-11-30 ENCOUNTER — HOSPITAL ENCOUNTER (EMERGENCY)
Facility: HOSPITAL | Age: 58
Discharge: HOME | End: 2023-11-30
Attending: EMERGENCY MEDICINE
Payer: MEDICAID

## 2023-11-30 ENCOUNTER — APPOINTMENT (OUTPATIENT)
Dept: GENERAL RADIOLOGY | Facility: HOSPITAL | Age: 58
End: 2023-11-30
Payer: MEDICAID

## 2023-11-30 VITALS
DIASTOLIC BLOOD PRESSURE: 72 MMHG | HEART RATE: 76 BPM | HEIGHT: 64 IN | BODY MASS INDEX: 40.97 KG/M2 | TEMPERATURE: 98 F | RESPIRATION RATE: 18 BRPM | SYSTOLIC BLOOD PRESSURE: 108 MMHG | OXYGEN SATURATION: 94 % | WEIGHT: 240 LBS

## 2023-11-30 DIAGNOSIS — S46.911A STRAIN OF RIGHT SHOULDER, INITIAL ENCOUNTER: Primary | ICD-10-CM

## 2023-11-30 PROCEDURE — 99283 EMERGENCY DEPT VISIT LOW MDM: CPT

## 2023-11-30 PROCEDURE — 73030 X-RAY EXAM OF SHOULDER: CPT

## 2023-11-30 PROCEDURE — 73060 X-RAY EXAM OF HUMERUS: CPT

## 2023-11-30 PROCEDURE — 96372 THER/PROPH/DIAG INJ SC/IM: CPT

## 2023-11-30 PROCEDURE — 99284 EMERGENCY DEPT VISIT MOD MDM: CPT

## 2023-11-30 RX ORDER — HYDROCODONE BITARTRATE AND ACETAMINOPHEN 5; 325 MG/1; MG/1
1-2 TABLET ORAL EVERY 4 HOURS PRN
Qty: 12 TABLET | Refills: 0 | Status: SHIPPED | OUTPATIENT
Start: 2023-11-30

## 2023-11-30 RX ORDER — KETOROLAC TROMETHAMINE 30 MG/ML
30 INJECTION, SOLUTION INTRAMUSCULAR; INTRAVENOUS ONCE
Status: COMPLETED | OUTPATIENT
Start: 2023-11-30 | End: 2023-11-30

## 2023-11-30 NOTE — ED INITIAL ASSESSMENT (HPI)
Pt presents to ED with complaint of R shoulder/upper arm pain. Pt reports falling two days ago and catching her fall with her hands. Reports pain to upper arm \"feels like it's burning inside like shingles. \" No rash note.  Does report \"bump\" in right axilla

## 2023-12-04 RX ORDER — METHOCARBAMOL 500 MG/1
500 TABLET, FILM COATED ORAL 3 TIMES DAILY PRN
Qty: 90 TABLET | Refills: 0 | OUTPATIENT
Start: 2023-12-04

## 2023-12-05 ENCOUNTER — HOSPITAL ENCOUNTER (OUTPATIENT)
Dept: GENERAL RADIOLOGY | Facility: HOSPITAL | Age: 58
Discharge: HOME | End: 2023-12-05
Attending: INTERNAL MEDICINE
Payer: MEDICAID

## 2023-12-05 DIAGNOSIS — M79.7 FIBROMYALGIA: ICD-10-CM

## 2023-12-05 DIAGNOSIS — R70.0 ELEVATED SED RATE: ICD-10-CM

## 2023-12-05 DIAGNOSIS — M25.552 BILATERAL HIP PAIN: ICD-10-CM

## 2023-12-05 DIAGNOSIS — M25.551 BILATERAL HIP PAIN: ICD-10-CM

## 2023-12-05 DIAGNOSIS — M54.6 THORACIC SPINE PAIN: ICD-10-CM

## 2023-12-05 PROCEDURE — 72072 X-RAY EXAM THORAC SPINE 3VWS: CPT | Performed by: INTERNAL MEDICINE

## 2023-12-05 PROCEDURE — 73523 X-RAY EXAM HIPS BI 5/> VIEWS: CPT | Performed by: INTERNAL MEDICINE

## 2023-12-07 ENCOUNTER — OFFICE VISIT (OUTPATIENT)
Dept: FAMILY MEDICINE CLINIC | Facility: CLINIC | Age: 58
End: 2023-12-07
Payer: MEDICAID

## 2023-12-07 VITALS
SYSTOLIC BLOOD PRESSURE: 112 MMHG | BODY MASS INDEX: 42 KG/M2 | HEART RATE: 87 BPM | DIASTOLIC BLOOD PRESSURE: 78 MMHG | WEIGHT: 247 LBS

## 2023-12-07 DIAGNOSIS — R41.3 MEMORY LOSS: ICD-10-CM

## 2023-12-07 DIAGNOSIS — M25.511 CHRONIC RIGHT SHOULDER PAIN: Primary | ICD-10-CM

## 2023-12-07 DIAGNOSIS — N63.31 LUMP OF AXILLARY TAIL OF RIGHT BREAST: ICD-10-CM

## 2023-12-07 DIAGNOSIS — G89.29 CHRONIC RIGHT SHOULDER PAIN: Primary | ICD-10-CM

## 2023-12-07 DIAGNOSIS — L23.9 ALLERGIC DERMATITIS: ICD-10-CM

## 2023-12-07 DIAGNOSIS — M17.0 PRIMARY OSTEOARTHRITIS OF BOTH KNEES: ICD-10-CM

## 2023-12-07 PROCEDURE — 3078F DIAST BP <80 MM HG: CPT | Performed by: FAMILY MEDICINE

## 2023-12-07 PROCEDURE — 99214 OFFICE O/P EST MOD 30 MIN: CPT | Performed by: FAMILY MEDICINE

## 2023-12-07 PROCEDURE — 3074F SYST BP LT 130 MM HG: CPT | Performed by: FAMILY MEDICINE

## 2023-12-07 RX ORDER — HYDROXYZINE 50 MG/1
50 TABLET, FILM COATED ORAL 3 TIMES DAILY PRN
Qty: 90 TABLET | Refills: 3 | Status: SHIPPED | OUTPATIENT
Start: 2023-12-07

## 2023-12-07 NOTE — PATIENT INSTRUCTIONS
Schedule your diagnostic mammogram.    Schedule your physical therapy for your right shoulder and gait training. Schedule your appointment with the neurologist, Dr. Baron Ghosh, for follow up on the memory loss. [facility] about referral for neuropsych testing for the memory loss. Continue your medications as prescribed. See me in 3 months for follow up on your cholesterol, prediabetes and thyroid.

## 2023-12-11 ENCOUNTER — TELEPHONE (OUTPATIENT)
Dept: FAMILY MEDICINE CLINIC | Facility: CLINIC | Age: 58
End: 2023-12-11

## 2023-12-11 RX ORDER — TRIAMCINOLONE ACETONIDE 1 MG/G
CREAM TOPICAL
Qty: 30 G | Refills: 11 | Status: SHIPPED | OUTPATIENT
Start: 2023-12-11

## 2023-12-11 NOTE — TELEPHONE ENCOUNTER
Details of this decision are provided on the physician outcome notice which has been faxed to the number on file. Case ID: [id number]      Payer: [address] [place] [address]    [phone]   Electronic appeal: Not supported   View History       Diclofenac gel denied. Ok to direct to over the counter one?

## 2023-12-11 NOTE — TELEPHONE ENCOUNTER
Requested Prescriptions     Pending Prescriptions Disp Refills    TRIAMCINOLONE 0.1 % External Cream [Pharmacy Med Name: TRIAMCINOLONE 0.1% CREAM  30GM] 30 g 0     Sig: APPLY TOPICALLY TO THE AFFECTED AREA TWICE DAILY FOR 15 DAYS     Last refill: 11/14/23 BID x15 days    Last Appointment: 12/7/23    Next Appointment: No FOV's scheduled

## 2023-12-14 ENCOUNTER — TELEPHONE (OUTPATIENT)
Facility: CLINIC | Age: 58
End: 2023-12-14

## 2023-12-14 ENCOUNTER — HOSPITAL ENCOUNTER (OUTPATIENT)
Age: 58
Discharge: HOME | End: 2023-12-14
Payer: MEDICAID

## 2023-12-14 VITALS
TEMPERATURE: 97 F | OXYGEN SATURATION: 97 % | HEART RATE: 75 BPM | SYSTOLIC BLOOD PRESSURE: 131 MMHG | RESPIRATION RATE: 20 BRPM | DIASTOLIC BLOOD PRESSURE: 90 MMHG

## 2023-12-14 DIAGNOSIS — R68.89 FLU-LIKE SYMPTOMS: Primary | ICD-10-CM

## 2023-12-14 LAB — SARS-COV-2 RNA RESP QL NAA+PROBE: NOT DETECTED

## 2023-12-14 PROCEDURE — U0002 COVID-19 LAB TEST NON-CDC: HCPCS | Performed by: PHYSICIAN ASSISTANT

## 2023-12-14 PROCEDURE — 99213 OFFICE O/P EST LOW 20 MIN: CPT | Performed by: PHYSICIAN ASSISTANT

## 2023-12-14 NOTE — DISCHARGE INSTRUCTIONS
Please return to the ER/clinic if symptoms worsen. Follow-up with your PCP in 24-48 hours as needed. Push fluids. Get plenty of rest.  Recommend over-the-counter antihistamine daily i.e. Zyrtec. Take a good multivitamin. Take Motrin and or Tylenol for discomfort. If symptoms persist or worsen i.e. increasing fatigue body aches etc. recommend retesting and following up with your primary care physician for further evaluation and treatment.

## 2023-12-14 NOTE — TELEPHONE ENCOUNTER
Patients sister wants [facility] to know that the patient has not had the esophageal xray test done and that she needs to reschedule her Saturday appointment as she may possibly have covid.

## 2023-12-14 NOTE — TELEPHONE ENCOUNTER
LOUISE Christianson    See below message-patient was scheduled for the XR Esophagus for Saturday but may have COVID so needs to reschedule this.    Thank you

## 2023-12-28 ENCOUNTER — TELEPHONE (OUTPATIENT)
Dept: FAMILY MEDICINE CLINIC | Facility: CLINIC | Age: 58
End: 2023-12-28

## 2023-12-28 RX ORDER — HALOPERIDOL 5 MG/1
5 TABLET ORAL 2 TIMES DAILY
COMMUNITY
Start: 2023-12-21

## 2023-12-28 RX ORDER — AMITRIPTYLINE HYDROCHLORIDE 150 MG/1
150 TABLET ORAL DAILY
COMMUNITY
Start: 2023-12-09

## 2023-12-28 RX ORDER — LITHIUM CARBONATE 300 MG/1
300 CAPSULE ORAL 2 TIMES DAILY
COMMUNITY
Start: 2023-12-13

## 2023-12-28 RX ORDER — ARIPIPRAZOLE 20 MG/1
20 TABLET ORAL DAILY
COMMUNITY
Start: 2023-12-13

## 2023-12-28 RX ORDER — CYANOCOBALAMIN (VITAMIN B-12) 1000 MCG
1000 TABLET ORAL DAILY
Qty: 90 TABLET | Refills: 1 | Status: SHIPPED | OUTPATIENT
Start: 2023-12-28 | End: 2024-01-27

## 2023-12-28 NOTE — TELEPHONE ENCOUNTER
B12 is OTC    Can start 1000mcg daily     I did send in, but not sure if insurance will cover    Schedule f/u with PCP to further review

## 2023-12-28 NOTE — TELEPHONE ENCOUNTER
Spoke to pt's sister and that provider out of office until next week and will forward this message to provider for at that time. Pt's sister verbalized understanding.

## 2023-12-28 NOTE — TELEPHONE ENCOUNTER
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

## 2023-12-28 NOTE — TELEPHONE ENCOUNTER
Spoke to pt's sister with instructions and she has been giving pt B12 complex because something to do with Vit B1 and this particular disease.

## 2024-01-02 ENCOUNTER — TELEPHONE (OUTPATIENT)
Dept: FAMILY MEDICINE CLINIC | Facility: CLINIC | Age: 59
End: 2024-01-02

## 2024-01-02 NOTE — TELEPHONE ENCOUNTER
Spoke w/sister. Dr. Vargas wants her to go to IC in case Xrays are needed. Sister verbalized agreement and understanding.

## 2024-01-02 NOTE — TELEPHONE ENCOUNTER
Patient's sister calling to schedule appt with Dr. Vargas for arm pain/poking sensation for a few days. Please advise on an appt.

## 2024-01-12 ENCOUNTER — HOSPITAL ENCOUNTER (OUTPATIENT)
Dept: GENERAL RADIOLOGY | Facility: HOSPITAL | Age: 59
Discharge: HOME | End: 2024-01-12
Attending: PHYSICIAN ASSISTANT
Payer: MEDICAID

## 2024-01-12 ENCOUNTER — LAB ENCOUNTER (OUTPATIENT)
Dept: LAB | Facility: HOSPITAL | Age: 59
End: 2024-01-12
Attending: PHYSICIAN ASSISTANT
Payer: MEDICAID

## 2024-01-12 DIAGNOSIS — M79.7 FIBROMYALGIA: ICD-10-CM

## 2024-01-12 DIAGNOSIS — M06.00 SERONEGATIVE RHEUMATOID ARTHRITIS (HCC): ICD-10-CM

## 2024-01-12 DIAGNOSIS — E03.9 ACQUIRED HYPOTHYROIDISM: ICD-10-CM

## 2024-01-12 DIAGNOSIS — M25.551 BILATERAL HIP PAIN: ICD-10-CM

## 2024-01-12 DIAGNOSIS — Z11.1 SCREENING FOR TUBERCULOSIS: ICD-10-CM

## 2024-01-12 DIAGNOSIS — R13.19 ESOPHAGEAL DYSPHAGIA: ICD-10-CM

## 2024-01-12 DIAGNOSIS — R70.0 ELEVATED SED RATE: ICD-10-CM

## 2024-01-12 DIAGNOSIS — Z11.59 NEED FOR HEPATITIS C SCREENING TEST: ICD-10-CM

## 2024-01-12 DIAGNOSIS — M54.6 THORACIC SPINE PAIN: ICD-10-CM

## 2024-01-12 DIAGNOSIS — Z11.59 NEED FOR HEPATITIS B SCREENING TEST: ICD-10-CM

## 2024-01-12 DIAGNOSIS — M25.552 BILATERAL HIP PAIN: ICD-10-CM

## 2024-01-12 DIAGNOSIS — R79.89 ABNORMAL BLOOD CREATININE LEVEL: ICD-10-CM

## 2024-01-12 LAB
ALBUMIN SERPL-MCNC: 3.9 G/DL (ref 3.4–5)
ALBUMIN/GLOB SERPL: 0.9 {RATIO} (ref 1–2)
ALP LIVER SERPL-CCNC: 86 U/L
ALT SERPL-CCNC: 29 U/L
ANION GAP SERPL CALC-SCNC: 6 MMOL/L (ref 0–18)
AST SERPL-CCNC: 18 U/L (ref 15–37)
BILIRUB SERPL-MCNC: 0.3 MG/DL (ref 0.1–2)
BUN BLD-MCNC: 12 MG/DL (ref 9–23)
CALCIUM BLD-MCNC: 9.3 MG/DL (ref 8.5–10.1)
CHLORIDE SERPL-SCNC: 104 MMOL/L (ref 98–112)
CO2 SERPL-SCNC: 28 MMOL/L (ref 21–32)
CREAT BLD-MCNC: 0.95 MG/DL
CRP SERPL-MCNC: 1.03 MG/DL (ref ?–0.3)
DEPRECATED HBV CORE AB SER IA-ACNC: 8 NG/ML
EGFRCR SERPLBLD CKD-EPI 2021: 70 ML/MIN/1.73M2 (ref 60–?)
ERYTHROCYTE [SEDIMENTATION RATE] IN BLOOD: 92 MM/HR
FASTING STATUS PATIENT QL REPORTED: YES
FOLATE SERPL-MCNC: 16 NG/ML (ref 8.7–?)
GLOBULIN PLAS-MCNC: 4.4 G/DL (ref 2.8–4.4)
GLUCOSE BLD-MCNC: 135 MG/DL (ref 70–99)
HBV CORE AB SERPL QL IA: NONREACTIVE
HBV SURFACE AB SER QL: NONREACTIVE
HBV SURFACE AB SERPL IA-ACNC: <3.1 MIU/ML
HBV SURFACE AG SER-ACNC: <0.1 [IU]/L
HBV SURFACE AG SERPL QL IA: NONREACTIVE
HCV AB SERPL QL IA: NONREACTIVE
IRON SATN MFR SERPL: 10 %
IRON SERPL-MCNC: 49 UG/DL
OSMOLALITY SERPL CALC.SUM OF ELEC: 288 MOSM/KG (ref 275–295)
POTASSIUM SERPL-SCNC: 3.6 MMOL/L (ref 3.5–5.1)
PROT SERPL-MCNC: 8.3 G/DL (ref 6.4–8.2)
SODIUM SERPL-SCNC: 138 MMOL/L (ref 136–145)
T4 FREE SERPL-MCNC: 0.7 NG/DL (ref 0.8–1.7)
TIBC SERPL-MCNC: 499 UG/DL (ref 240–450)
TRANSFERRIN SERPL-MCNC: 335 MG/DL (ref 200–360)
TSI SER-ACNC: 22.9 MIU/ML (ref 0.36–3.74)
VIT B12 SERPL-MCNC: 653 PG/ML (ref 193–986)

## 2024-01-12 PROCEDURE — 84165 PROTEIN E-PHORESIS SERUM: CPT

## 2024-01-12 PROCEDURE — 83550 IRON BINDING TEST: CPT

## 2024-01-12 PROCEDURE — 82728 ASSAY OF FERRITIN: CPT

## 2024-01-12 PROCEDURE — 83521 IG LIGHT CHAINS FREE EACH: CPT

## 2024-01-12 PROCEDURE — 84443 ASSAY THYROID STIM HORMONE: CPT

## 2024-01-12 PROCEDURE — 82607 VITAMIN B-12: CPT

## 2024-01-12 PROCEDURE — 83540 ASSAY OF IRON: CPT

## 2024-01-12 PROCEDURE — 81374 HLA I TYPING 1 ANTIGEN LR: CPT

## 2024-01-12 PROCEDURE — 36415 COLL VENOUS BLD VENIPUNCTURE: CPT

## 2024-01-12 PROCEDURE — 82746 ASSAY OF FOLIC ACID SERUM: CPT

## 2024-01-12 PROCEDURE — 86704 HEP B CORE ANTIBODY TOTAL: CPT

## 2024-01-12 PROCEDURE — 86480 TB TEST CELL IMMUN MEASURE: CPT

## 2024-01-12 PROCEDURE — 87340 HEPATITIS B SURFACE AG IA: CPT

## 2024-01-12 PROCEDURE — 86803 HEPATITIS C AB TEST: CPT

## 2024-01-12 PROCEDURE — 74220 X-RAY XM ESOPHAGUS 1CNTRST: CPT | Performed by: PHYSICIAN ASSISTANT

## 2024-01-12 PROCEDURE — 80053 COMPREHEN METABOLIC PANEL: CPT

## 2024-01-12 PROCEDURE — 86334 IMMUNOFIX E-PHORESIS SERUM: CPT

## 2024-01-12 PROCEDURE — 86706 HEP B SURFACE ANTIBODY: CPT

## 2024-01-12 PROCEDURE — 86140 C-REACTIVE PROTEIN: CPT

## 2024-01-12 PROCEDURE — 84439 ASSAY OF FREE THYROXINE: CPT

## 2024-01-12 PROCEDURE — 85652 RBC SED RATE AUTOMATED: CPT

## 2024-01-14 ENCOUNTER — TELEPHONE (OUTPATIENT)
Dept: RHEUMATOLOGY | Facility: CLINIC | Age: 59
End: 2024-01-14

## 2024-01-14 DIAGNOSIS — R79.0 LOW FERRITIN: Primary | ICD-10-CM

## 2024-01-14 DIAGNOSIS — E03.9 ACQUIRED HYPOTHYROIDISM: ICD-10-CM

## 2024-01-14 DIAGNOSIS — M06.00 SERONEGATIVE RHEUMATOID ARTHRITIS (HCC): ICD-10-CM

## 2024-01-14 DIAGNOSIS — R73.03 PREDIABETES: Primary | ICD-10-CM

## 2024-01-14 RX ORDER — FERROUS SULFATE 325(65) MG
325 TABLET ORAL
Qty: 90 TABLET | Refills: 1 | Status: SHIPPED | OUTPATIENT
Start: 2024-01-14

## 2024-01-14 RX ORDER — PREDNISONE 5 MG/1
15 TABLET ORAL DAILY
Qty: 90 TABLET | Refills: 0 | Status: SHIPPED | OUTPATIENT
Start: 2024-01-14 | End: 2024-02-13

## 2024-01-14 RX ORDER — LEVOTHYROXINE SODIUM 112 UG/1
112 TABLET ORAL
Qty: 90 TABLET | Refills: 0 | Status: SHIPPED | OUTPATIENT
Start: 2024-01-14 | End: 2024-04-13

## 2024-01-15 LAB
M TB IFN-G CD4+ T-CELLS BLD-ACNC: 0 IU/ML
M TB TUBERC IFN-G BLD QL: NEGATIVE
M TB TUBERC IGNF/MITOGEN IGNF CONTROL: >10 IU/ML
QFT TB1 AG MINUS NIL: 0 IU/ML
QFT TB2 AG MINUS NIL: 0 IU/ML

## 2024-01-16 LAB
ALBUMIN SERPL ELPH-MCNC: 4.12 G/DL (ref 3.75–5.21)
ALBUMIN/GLOB SERPL: 1.04 {RATIO} (ref 1–2)
ALPHA1 GLOB SERPL ELPH-MCNC: 0.55 G/DL (ref 0.19–0.46)
ALPHA2 GLOB SERPL ELPH-MCNC: 0.87 G/DL (ref 0.48–1.05)
B-GLOBULIN SERPL ELPH-MCNC: 0.99 G/DL (ref 0.68–1.23)
GAMMA GLOB SERPL ELPH-MCNC: 1.57 G/DL (ref 0.62–1.7)
KAPPA LC FREE SER-MCNC: 3.88 MG/DL (ref 0.33–1.94)
KAPPA LC FREE/LAMBDA FREE SER NEPH: 1.58 {RATIO} (ref 0.26–1.65)
LAMBDA LC FREE SERPL-MCNC: 2.46 MG/DL (ref 0.57–2.63)
PROT SERPL-MCNC: 8.1 G/DL (ref 5.7–8.2)

## 2024-01-18 LAB — HLA-B27: NEGATIVE

## 2024-01-19 ENCOUNTER — TELEPHONE (OUTPATIENT)
Facility: CLINIC | Age: 59
End: 2024-01-19

## 2024-01-19 DIAGNOSIS — Z80.0 FH: GI CANCER: ICD-10-CM

## 2024-01-19 DIAGNOSIS — Z12.11 COLON CANCER SCREENING: Primary | ICD-10-CM

## 2024-01-19 NOTE — TELEPHONE ENCOUNTER
Left message to call back  Meghan sent patient below [employer].      \"Hi Sri,      Your esophogram did not demonstrate any abnormalities that would be causing your symptoms. I wanted to see if you had any improvement in yours symptoms on the omeprazole.      I advise scheduling a follow up with myself to discuss any update on symptoms.      Please let me know if you have any questions.      Meghan Mckenna PA-C    This [employer] message has not been read.\"

## 2024-01-20 NOTE — TELEPHONE ENCOUNTER
I contacted patient on home phone listed and her friend Nirmala answered, she stated that Mrs Sri Yoo has moved back to [place] with her daughter.  I called mobile number and left a detailed voicemail message to call us back.  I removed patient from provider schedule and sent a case change request to endo to cancel procedure.    See TE 11/03/3023    canceled for: Colonoscopy [id number]   Provider Name: Dr Arciniega  Date: Wed 4/24/2024   Location: [facility]   Sedation: MAC  Time: 12:30 pm   Prep: split trilyte   Meds/Allergies Reconciled?: Reviewed by provider   Diagnosis with codes: colon screening Z12.11, Family Hx colon cancer Z80.0

## 2024-01-25 NOTE — TELEPHONE ENCOUNTER
Left message to call back.    Our office tried to contact patient on three separate occasions with no answer or call back.  No response letter mailed to home address.  Encounter closed per protocol.

## 2024-02-08 ENCOUNTER — TELEPHONE (OUTPATIENT)
Facility: CLINIC | Age: 59
End: 2024-02-08

## 2024-02-08 NOTE — TELEPHONE ENCOUNTER
Current Outpatient Medications   Medication Sig Dispense Refill    omeprazole 20 MG Oral Capsule Delayed Release Take 1 capsule (20 mg total) by mouth 2 (two) times daily before meals. (Patient taking differently: Take 2 capsules (40 mg total) by mouth daily with breakfast.) 180 capsule 1         Request came from [facility] in [place]    Med not covered. Call plan to initiate PA.

## 2024-02-09 NOTE — TELEPHONE ENCOUNTER
Fax received from [facility].  Omeprazole 20mg was approved effective 1/1/2024 and ends 6/8/2024.  Requested quantity up to 2 units per day.  Case Number: [id number]    I called patient's pharmacy and verified it went through with a 0$ copay.  Left detailed message on patient's voicemail informing her (ok per Verbal Release on file).

## 2024-07-17 DIAGNOSIS — M06.00 SERONEGATIVE RHEUMATOID ARTHRITIS (HCC): ICD-10-CM

## 2024-07-17 DIAGNOSIS — R79.0 LOW FERRITIN: Primary | ICD-10-CM

## 2024-07-17 RX ORDER — FERROUS SULFATE 325(65) MG
325 TABLET ORAL
Qty: 90 TABLET | Refills: 1 | Status: SHIPPED | OUTPATIENT
Start: 2024-07-17

## 2024-07-17 NOTE — TELEPHONE ENCOUNTER
Last office visit: 11/22/2023      Next Rheum Apt:Visit date not found    Last fill: 1/14/2024  90 tabs, 1 refill     Labs:   Lab Results   Component Value Date    CREATSERUM 0.95 01/12/2024    ALKPHO 86 01/12/2024    AST 18 01/12/2024    ALT 29 01/12/2024    BILT 0.3 01/12/2024    TP 8.3 (H) 01/12/2024    TP 8.1 01/12/2024    ALB 3.9 01/12/2024    ALB 4.12 01/12/2024       Lab Results   Component Value Date    WBC 11.7 (H) 11/18/2023    HGB 12.6 11/18/2023    .0 11/18/2023    NEPRELIM 7.86 (H) 11/18/2023    NEPERCENT 67.1 11/18/2023    LYPERCENT 21.2 11/18/2023    NE 7.86 (H) 11/18/2023    LYMABS 2.49 11/18/2023

## 2025-01-03 ENCOUNTER — TELEPHONE (OUTPATIENT)
Facility: CLINIC | Age: 60
End: 2025-01-03

## (undated) NOTE — LETTER
1/25/2024              Sri Yoo        [address] [facility] [address]         Dear Sri,    This letter is to inform you that our office has made several attempts to reach you by phone without success.  We were attempting to return your call about your results.  Meghan did release below result note to your MyChart but we wanted to see if you had questions:    \"Hi Sri,      Your esophogram did not demonstrate any abnormalities that would be causing your symptoms. I wanted to see if you had any improvement in yours symptoms on the omeprazole.      I advise scheduling a follow up with myself to discuss any update on symptoms.      Please let me know if you have any questions.      Meghan Mckenna PA-C\"    Please contact our office at the number listed below as soon as you receive this letter to discuss this issue and to make the necessary changes in our system to your contact information.  Thank you for your cooperation.        Sincerely,    Meghan Mckenna PA-C  [address]  [facility] [address] [phone]

## (undated) NOTE — LETTER
ASTHMA ACTION PLAN for Donovan Paredes     : 1965     Date: 23  Doctor:  James Chávez DO  Phone for doctor or clinic: [facility], [address]  [address]  [phone]      ACT Score: 16    ACT Goal: 20 or greater    Call your provider if you require your rescue/quick reliever medication more than 2-3 times in a 24 hour period. If you require your rescue inhaler/medication more than 2-3 times weekly, your asthma may not be under proper control and you should seek medical attention. *Quick Relievers are Xopenex and Albuterol*    You can use the colors of a traffic light to help learn about your asthma medicines. Year Round       1. Green - Go! % of Personal Best Peak Flow   Use controller medicine. Breathing is good  No cough or wheeze  Can work and play Medicine How much to take When to take it    Medications       Sympathomimetics Instructions     albuterol 108 (90 Base) MCG/ACT Inhalation Aero Soln    Inhale 2 puffs into the lungs every 4 (four) hours as needed for Wheezing or Shortness of Breath. 2. Yellow - Caution. 50-79% Personal Best Peak Flow  Use reliever medicine to keep an asthma attack from getting bad. Cough  Quick Relievers  Wheezing  Tight Chest  Wake up at night Medicine How much to take When to take it    If symptoms are not improving in 24-48 hrs, call office for further instructions  Medications       Sympathomimetics Instructions     albuterol 108 (90 Base) MCG/ACT Inhalation Aero Soln    Inhale 2 puffs into the lungs every 4 (four) hours as needed for Wheezing or Shortness of Breath. 3. Red - Stop!  Danger! <50% Personal Best Peak Flow  Continue Controller Medications But ADD:   Medicine not helping  Breathing is hard and fast  Nose opens wide  Can't walk  Ribs show  Can't talk well Medicine How much to take When to take it    If your symptoms do not improve in ONE hour -  go to the emergency room or call 911 immediately! If symptoms improve, call office for appointment immediately. Albuterol inhaler 2 puffs every 20 minutes for three treatments       Don't forget:  Rinse mouth after using inhaler  Use spacer for inhaler  Remember to get your Flu vaccine every fall! [x] Asthma Action Plan reviewed with the caregiver and patient, and a copy of the plan was given to the patient/caregiver. [] Asthma Action Plan reviewed with the caregiver and patient on the phone, and copy mailed to patient/caregiver or sent via [employer]      Signatures:   Provider  Yoana Jimenez DO Patient  Rhoda Slider Caretaker